# Patient Record
Sex: FEMALE | Race: WHITE | NOT HISPANIC OR LATINO | Employment: FULL TIME | URBAN - METROPOLITAN AREA
[De-identification: names, ages, dates, MRNs, and addresses within clinical notes are randomized per-mention and may not be internally consistent; named-entity substitution may affect disease eponyms.]

---

## 2017-01-31 ENCOUNTER — OFFICE VISIT (OUTPATIENT)
Dept: FAMILY MEDICINE | Facility: CLINIC | Age: 27
End: 2017-01-31
Payer: COMMERCIAL

## 2017-01-31 VITALS
SYSTOLIC BLOOD PRESSURE: 104 MMHG | BODY MASS INDEX: 20.98 KG/M2 | WEIGHT: 138.4 LBS | HEIGHT: 68 IN | OXYGEN SATURATION: 100 % | HEART RATE: 81 BPM | TEMPERATURE: 97.7 F | DIASTOLIC BLOOD PRESSURE: 72 MMHG

## 2017-01-31 DIAGNOSIS — R21 RASH: ICD-10-CM

## 2017-01-31 DIAGNOSIS — Z80.8 FAMILY HISTORY OF MELANOMA: ICD-10-CM

## 2017-01-31 DIAGNOSIS — B35.4 TINEA CORPORIS: Primary | ICD-10-CM

## 2017-01-31 LAB
KOH PREP SPEC: ABNORMAL
MICRO REPORT STATUS: ABNORMAL
SPECIMEN SOURCE: ABNORMAL

## 2017-01-31 PROCEDURE — 87220 TISSUE EXAM FOR FUNGI: CPT | Performed by: FAMILY MEDICINE

## 2017-01-31 PROCEDURE — 99214 OFFICE O/P EST MOD 30 MIN: CPT | Performed by: FAMILY MEDICINE

## 2017-01-31 RX ORDER — CICLOPIROX OLAMINE 7.7 MG/G
CREAM TOPICAL 2 TIMES DAILY
Qty: 30 G | Refills: 1 | Status: SHIPPED | OUTPATIENT
Start: 2017-01-31 | End: 2017-03-29

## 2017-01-31 NOTE — PROGRESS NOTES
SUBJECTIVE:                                                    Laura Wilkes is a 26 year old female who presents to clinic today for the following health issues:      Rash      Duration: 1 month    Description  Location: R leg  Itching: moderate    Intensity:  moderate    Accompanying signs and symptoms: None    History (similar episodes/previous evaluation): None    Precipitating or alleviating factors:  New exposures:  None  Recent travel: no      Therapies tried and outcome: clotrimazole and Lamisil     Laura has had the red, itchy patches on her right leg for the past month. She has tried both clotrimazole and Lamisil without improvement. Started with clotrimazole which did not improve rash then tried Lamisil. The rashes did not get worse but did not get better with treatment. She was using the creams once daily. 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 Has had no changes in detergent, no allergies, no new lotions, no history of tick bites. No new pets. No one around her has this rash. Has not had pain or swelling with the rash. Has a history of exercise induced asthma, reports she rarely takes albuterol unless she is going for a long ride on her bike. No other rashes on her body, although she does have some dry skin patches on her left leg. Mother was diagnosed at age 52 with melanoma, patient has not seen dermatologist before.       -------------------------------------    Problem list and histories reviewed & adjusted, as indicated.  Additional history: as documented    Patient Active Problem List   Diagnosis     Family history of melanoma     Asthma, exercise induced     Left knee pain     No past surgical history on file.    Social History   Substance Use Topics     Smoking status: Never Smoker      Smokeless tobacco: Never Used     Alcohol Use: 0.0 oz/week     0 Standard drinks or equivalent per week      Comment: 3-4 drinks week     Family History  "  Problem Relation Age of Onset     Melanoma Mother 52         Current Outpatient Prescriptions   Medication Sig Dispense Refill     ciclopirox (LOPROX) 0.77 % cream Apply topically 2 times daily 30 g 1     albuterol (ALBUTEROL) 108 (90 BASE) MCG/ACT inhaler Inhale 1-2 puffs into the lungs every 6 hours as needed for shortness of breath / dyspnea 1 Inhaler 6     No Known Allergies  Labs reviewed in EPIC    ROS:  Constitutional, HEENT, cardiovascular, pulmonary, gi and gu systems are negative, except as otherwise noted.    OBJECTIVE:                                                    /72 mmHg  Pulse 81  Temp(Src) 97.7  F (36.5  C) (Oral)  Ht 5' 7.5\" (1.715 m)  Wt 138 lb 6.4 oz (62.778 kg)  BMI 21.34 kg/m2  SpO2 100%  LMP 01/19/2017  Body mass index is 21.34 kg/(m^2).  GENERAL: healthy, alert and no distress  MS: no gross musculoskeletal defects noted, no edema  SKIN:  right lower extremity: four,1cm x 1cm, erythematous circular plaques on shin and medial calf. Borders of circular patch are darker red than center. Plaques have some scaling and are rough to touch. Left lower extremity has two small, non erythematous, rough patches of skin near knee and near ankle on anterior shin.   PSYCH: mentation appears normal, affect normal/bright    Diagnostic Test Results:  Results for orders placed or performed in visit on 01/31/17 (from the past 24 hour(s))   KOH prep (skin, hair or nails only)   Result Value Ref Range    Specimen Description Leg     JAYSON Skin Hair Nails Test Fungal elements seen (A)     Micro Report Status FINAL 01/31/2017         ASSESSMENT/PLAN:                                                        ICD-10-CM    1. Tinea corporis B35.4 ciclopirox (LOPROX) 0.77 % cream   2. Rash R21 KOH prep (skin, hair or nails only)   3. Family history of melanoma Z80.8 DERMATOLOGY REFERRAL       Rash: most likely tinea corporis of right lower extremity given the following characteristics: erythematous, " circular plaques on the anterior shin with darker red borders compared to center of rash.  KOH positive today. Although patient has tried two different antifungal topical agents, she's used each once daily for about a week- likely not enough. Less likely eczema as patient has no history of eczema, although it was red and itching with dry rough skin. Characteristic of the rash less likely eczema. Less likely psoriasis without patient history of psoriasis and the rash lacked silvery scales.     - ciclopirox 0.77% apply topically 2 times daily.    - apply a few days after rash has cleared     Derm referral for family history of melanoma (pt mother diagnosed at age 52).      This note is scribed by Radha Boucher MS3 on behalf of Dr. Broussard.     The medical student has acted as my scribe.  I have completed all components of the history, physical exam and assessment and plan and agree with the note as documented.  Dea Broussard MD  [facility]

## 2017-01-31 NOTE — NURSING NOTE
"Chief Complaint   Patient presents with     Derm Problem     rash on R leg, x 1 month, has tried OTC creams/lotions with no improvement     /72 mmHg  Pulse 81  Temp(Src) 97.7  F (36.5  C) (Oral)  Ht 5' 7.5\" (1.715 m)  Wt 138 lb 6.4 oz (62.778 kg)  BMI 21.34 kg/m2  SpO2 100%  LMP 01/19/2017 Estimated body mass index is 21.34 kg/(m^2) as calculated from the following:    Height as of this encounter: 5' 7.5\" (1.715 m).    Weight as of this encounter: 138 lb 6.4 oz (62.778 kg).  BP completed using cuff size: regular       Health Maintenance due pending provider review:  ACT    PHQ/ACT/FATOUMATA--Gave pt questionnare    Prerna Salazar CMA    "

## 2017-02-01 ASSESSMENT — ASTHMA QUESTIONNAIRES: ACT_TOTALSCORE: 25

## 2017-03-03 ENCOUNTER — OFFICE VISIT (OUTPATIENT)
Dept: FAMILY MEDICINE | Facility: CLINIC | Age: 27
End: 2017-03-03
Payer: COMMERCIAL

## 2017-03-03 DIAGNOSIS — B35.4 TINEA CORPORIS: Primary | ICD-10-CM

## 2017-03-03 PROCEDURE — 99213 OFFICE O/P EST LOW 20 MIN: CPT | Performed by: FAMILY MEDICINE

## 2017-03-03 RX ORDER — ECONAZOLE NITRATE 10 MG/G
CREAM TOPICAL
Qty: 30 G | Refills: 1 | Status: SHIPPED | OUTPATIENT
Start: 2017-03-03 | End: 2017-06-01

## 2017-03-03 NOTE — MR AVS SNAPSHOT
After Visit Summary   3/3/2017    Laura Wilkes    MRN: [id number]           Patient Information     Date Of Birth          1990        Visit Information        Provider Department      3/3/2017 7:20 AM Cindi Weldon MD [facility] Primary Care Skin        Today's Diagnoses     Tinea corporis    -  1      Care Instructions    FUTURE APPOINTMENTS  Follow up with Dr. Weldon as needed if symptoms not resolving.  You should have annual full-body skin cancer screenings due to your family history of melanoma.    TOPICAL ANTIFUNGAL  Apply econzaole 1% cream generously (half an inch beyond the borders) one time(s) a day for 3 weeks to the affected areas.    Discontinue use of ciclopirox 0.77% cream.    Purchase and/or clean new blades and razors. Do not shave legs until symptoms are fully resolved.  Continue applying moisturizer regularly after application of antifungal cream.        Follow-ups after your visit        Follow-up notes from your care team     Return if symptoms worsen or fail to improve.      Who to contact     If you have questions or need follow up information about today's clinic visit or your schedule please contact [facility] - PRIMARY CARE SKIN directly at [phone].  Normal or non-critical lab and imaging results will be communicated to you by MyChart, letter or phone within 4 business days after the clinic has received the results. If you do not hear from us within 7 days, please contact the clinic through G[employer]ness[employer] or phone. If you have a critical or abnormal lab result, we will notify you by phone as soon as possible.  Submit refill requests through Clipyoo or call your pharmacy and they will forward the refill request to us. Please allow 3 business days for your refill to be completed.          Additional Information About Your Visit        MyChart Information     Clipyoo lets you send messages to your doctor, view your test results, renew your  "prescriptions, schedule appointments and more. To sign up, go to [email or web address] . Click on \"Log in\" on the left side of the screen, which will take you to the Welcome page. Then click on \"Sign up Now\" on the right side of the page.     You will be asked to enter the access code listed below, as well as some personal information. Please follow the directions to create your username and password.     Your access code is: [email or web address]  Expires: 2017  9:57 AM     Your access code will  in 90 days. If you need help or a new code, please call your [place] clinic or [phone].        Care EveryWhere ID     This is your Care EveryWhere ID. This could be used by other organizations to access your [place] medical records  [id number]         Blood Pressure from Last 3 Encounters:   17 104/72   16 98/66   16 96/58    Weight from Last 3 Encounters:   17 138 lb 6.4 oz (62.8 kg)   16 133 lb 8 oz (60.6 kg)   16 134 lb (60.8 kg)              Today, you had the following     No orders found for display         Today's Medication Changes          These changes are accurate as of: 3/3/17  7:37 AM.  If you have any questions, ask your nurse or doctor.               Start taking these medicines.        Dose/Directions    econazole nitrate 1 % cream   Used for:  Tinea corporis   Started by:  Cindi Weldon MD        Apply once per day for 3 weeks   Quantity:  30 g   Refills:  1            Where to get your medicines      These medications were sent to [facility] [id number] [place] [address] [address] [facility]  [address] [place] [address]     Phone:  [phone]     econazole nitrate 1 % cream                Primary Care Provider Office Phone # Fax #    Bailey SIGRID Garza [phone]       [facility] [address]  [facility] [address]        Thank you!     Thank you for choosing " [facility] - PRIMARY CARE SKIN  for your care. Our goal is always to provide you with excellent care. Hearing back from our patients is one way we can continue to improve our services. Please take a few minutes to complete the written survey that you may receive in the mail after your visit with us. Thank you!             Your Updated Medication List - Protect others around you: Learn how to safely use, store and throw away your medicines at www.disposemymeds.org.          This list is accurate as of: 3/3/17  7:37 AM.  Always use your most recent med list.                   Brand Name Dispense Instructions for use    albuterol 108 (90 BASE) MCG/ACT Inhaler    albuterol    1 Inhaler    Inhale 1-2 puffs into the lungs every 6 hours as needed for shortness of breath / dyspnea       ciclopirox 0.77 % cream    LOPROX    30 g    Apply topically 2 times daily       econazole nitrate 1 % cream     30 g    Apply once per day for 3 weeks

## 2017-03-03 NOTE — PATIENT INSTRUCTIONS
FUTURE APPOINTMENTS  Follow up with Dr. Weldon as needed if symptoms not resolving.  You should have annual full-body skin cancer screenings due to your family history of melanoma.    TOPICAL ANTIFUNGAL  Apply econzaole 1% cream generously (half an inch beyond the borders) one time(s) a day for 3 weeks to the affected areas.    Discontinue use of ciclopirox 0.77% cream.    Purchase and/or clean new blades and razors. Do not shave legs until symptoms are fully resolved.  Continue applying moisturizer regularly after application of antifungal cream.

## 2017-03-03 NOTE — PROGRESS NOTES
[facility] - PRIMARY CARE SKIN    CC : Rash   SUBJECTIVE:                                                    Laura Wilkes is a 26 year old female who presents to clinic today because of a(n) rash beginning 6 weeks ago on the legs. Itchiness has flared in the last 1 week, and it is exacerbated by shaving.    Pruritic : extremely itchy.  Symptoms appear to be : worsening.  Aggravating factors : shaving.  Relieving factors : none identified.    Previous history of a similar rash : YES - recent 6 week history.  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    Therapies tried for rash : Rx ciclopirox 0.77% cream applied BID begun 1/31/17. OTC Lotrimin or Lamisil applied before primary care physician office visit.  Side effects noted : Left finger irritation after application of cream.    Lab results from 1/31/2017  Office Visit on 01/31/2017   Component Date Value Ref Range Status     Specimen Description 01/31/2017 Leg   Final     KOH Skin Hair Nails Test 01/31/2017 Fungal elements seen*  Final     Micro Report Status 01/31/2017 FINAL 01/31/2017   Final     Personal Medical History  Skin Cancer : NO  Eczema Psoriasis Rosacea Autoimmune Other   NO NO NO NO NO     Family Medical History  Skin Cancer : YES - melanoma in mother  Eczema Psoriasis Rosacea Autoimmune Other   NO NO NO NO NO     Occupation : teacher (indoor).    Patient Active Problem List   Diagnosis     Family history of melanoma     Asthma, exercise induced     Left knee pain       Past Medical History   Diagnosis Date     Asthma exacerbation, mild 1995     Family history of melanoma     History reviewed. No pertinent past surgical history.   Social History   Substance Use Topics     Smoking status: Never Smoker     Smokeless tobacco: Never Used     Alcohol use 0.0 oz/week     0 Standard drinks or equivalent per week      Comment: 3-4 drinks week    Family History     Problem (# of  Occurrences) Relation (Name,Age of Onset)    Melanoma (1) Mother (52)           Prescription Medications as of 3/3/2017             econazole nitrate 1 % cream Apply once per day for 3 weeks    ciclopirox (LOPROX) 0.77 % cream Apply topically 2 times daily    albuterol (ALBUTEROL) 108 (90 BASE) MCG/ACT inhaler Inhale 1-2 puffs into the lungs every 6 hours as needed for shortness of breath / dyspnea          No Known Allergies     INTEGUMENTARY/SKIN: POSITIVE for pruritis and rash  ROS : 14 point review of systems was negative except the symptoms listed above in the HPI.    This document serves as a record of the services and decisions personally performed and made by Nakita Weldon MD. It was created on her behalf by Lcio Delatorre, a trained medical scribe.  The creation of this document is based on the scribe's personal observations and the provider's statements to the medical scribe.  Lico Delatorre, March 3, 2017 7:25 AM      OBJECTIVE:                                                    GENERAL: healthy, alert and no distress  SKIN: Wiley Skin Type - II.  Legs were examined. The dermatoscope was used to help evaluate pigmented lesions.  Skin Pertinent Findings:  Right leg : Circular, salmon-colored patch with slight central clearing. Linear, salmon-colored patch with central clearing on shin.    Left leg : Scattered 5 mm in size patches of erythema with raised ring appearance.    Diagnostic Test Results:  none     MDM : change to econazole cream if not clearing with that , then consider oral terbinafine       ASSESSMENT:                                                      Encounter Diagnosis   Name Primary?     Tinea corporis Yes         PLAN:                                                    Patient Instructions   FUTURE APPOINTMENTS  Follow up with Dr. Weldon as needed if symptoms not resolving.  You should have annual full-body skin cancer screenings due to your family history of melanoma.    TOPICAL  ANTIFUNGAL  Apply econzaole 1% cream generously (half an inch beyond the borders) one time(s) a day for 3 weeks to the affected areas.    Discontinue use of ciclopirox 0.77% cream.    Purchase and/or clean new blades and razors. Do not shave legs until symptoms are fully resolved.  Continue applying moisturizer regularly after application of antifungal cream.        PROCEDURES:                                                    None.    TT : 20 minutes.  CT : 15 minutes.      The information in this document, created by the medical scribe for me, accurately reflects the services I personally performed and the decisions made by me. I have reviewed and approved this document for accuracy prior to leaving the patient care area.  Nakita Weldon MD March 3, 2017 7:25 AM  [facility] - PRIMARY CARE SKIN

## 2017-03-17 ENCOUNTER — TELEPHONE (OUTPATIENT)
Dept: FAMILY MEDICINE | Facility: CLINIC | Age: 27
End: 2017-03-17

## 2017-03-17 DIAGNOSIS — B35.4 TINEA CORPORIS: Primary | ICD-10-CM

## 2017-03-17 RX ORDER — TERBINAFINE HYDROCHLORIDE 250 MG/1
250 TABLET ORAL DAILY
Qty: 14 TABLET | Refills: 0 | Status: SHIPPED | OUTPATIENT
Start: 2017-03-17 | End: 2017-04-04

## 2017-03-17 NOTE — TELEPHONE ENCOUNTER
Left message on home number with below information    Lashawn Davison,RN  [facility]  [phone]

## 2017-03-17 NOTE — TELEPHONE ENCOUNTER
Patient left voice message re: fungal infection, was seen on 3/3/17 Dr. Weldon- prescribed econazole nitrate 1 % cream. Medication is not working, fungal infection is spreading. Would like to try oral medication? Other options? Please advise.  Preferred pharmacy: [facility] [id number] [place] [address] [address]  Ok to leave detailed message: yes  [phone] (home)     Thank you  Adrianna Gallagher

## 2017-03-17 NOTE — TELEPHONE ENCOUNTER
patient calling to notify that the topical hasn't helped any and the fungus has now spread to her arms.   She would like the oral fungal that was discussed prescribed.      Lashawn Davison RN  [facility]  [phone]

## 2017-03-17 NOTE — TELEPHONE ENCOUNTER
Please let her know that I faxed over terbinafine 250 mg one tab per day for 2 weeks, continue with the topical cream.

## 2017-03-27 ENCOUNTER — ANESTHESIA (OUTPATIENT)
Dept: SURGERY | Facility: CLINIC | Age: 27
End: 2017-03-27
Payer: COMMERCIAL

## 2017-03-27 ENCOUNTER — ANESTHESIA EVENT (OUTPATIENT)
Dept: SURGERY | Facility: CLINIC | Age: 27
End: 2017-03-27
Payer: COMMERCIAL

## 2017-03-27 ENCOUNTER — OFFICE VISIT (OUTPATIENT)
Dept: FAMILY MEDICINE | Facility: CLINIC | Age: 27
End: 2017-03-27
Payer: COMMERCIAL

## 2017-03-27 ENCOUNTER — APPOINTMENT (OUTPATIENT)
Dept: CT IMAGING | Facility: CLINIC | Age: 27
End: 2017-03-27
Attending: EMERGENCY MEDICINE
Payer: COMMERCIAL

## 2017-03-27 ENCOUNTER — HOSPITAL ENCOUNTER (OUTPATIENT)
Facility: CLINIC | Age: 27
Setting detail: OBSERVATION
Discharge: HOME | End: 2017-03-28
Attending: EMERGENCY MEDICINE | Admitting: SURGERY
Payer: COMMERCIAL

## 2017-03-27 ENCOUNTER — TELEPHONE (OUTPATIENT)
Dept: FAMILY MEDICINE | Facility: CLINIC | Age: 27
End: 2017-03-27

## 2017-03-27 VITALS
HEART RATE: 75 BPM | SYSTOLIC BLOOD PRESSURE: 106 MMHG | DIASTOLIC BLOOD PRESSURE: 64 MMHG | WEIGHT: 139 LBS | BODY MASS INDEX: 21.45 KG/M2 | RESPIRATION RATE: 12 BRPM | OXYGEN SATURATION: 99 % | TEMPERATURE: 98.9 F

## 2017-03-27 DIAGNOSIS — R10.31 ABDOMINAL PAIN, RIGHT LOWER QUADRANT: Primary | ICD-10-CM

## 2017-03-27 DIAGNOSIS — K35.30 ACUTE APPENDICITIS WITH LOCALIZED PERITONITIS: ICD-10-CM

## 2017-03-27 PROBLEM — K35.80 ACUTE APPENDICITIS: Status: ACTIVE | Noted: 2017-03-27

## 2017-03-27 LAB
ALBUMIN SERPL-MCNC: 3.8 G/DL (ref 3.4–5)
ALBUMIN UR-MCNC: 30 MG/DL
ALP SERPL-CCNC: 49 U/L (ref 40–150)
ALT SERPL W P-5'-P-CCNC: 51 U/L (ref 0–50)
ANION GAP SERPL CALCULATED.3IONS-SCNC: 8 MMOL/L (ref 3–14)
APPEARANCE UR: ABNORMAL
AST SERPL W P-5'-P-CCNC: 37 U/L (ref 0–45)
BACTERIA #/AREA URNS HPF: ABNORMAL /HPF
BASOPHILS # BLD AUTO: 0 10E9/L (ref 0–0.2)
BASOPHILS NFR BLD AUTO: 0.1 %
BILIRUB SERPL-MCNC: 0.7 MG/DL (ref 0.2–1.3)
BILIRUB UR QL STRIP: NEGATIVE
BUN SERPL-MCNC: 7 MG/DL (ref 7–30)
CALCIUM SERPL-MCNC: 8.4 MG/DL (ref 8.5–10.1)
CHLORIDE SERPL-SCNC: 106 MMOL/L (ref 94–109)
CO2 SERPL-SCNC: 26 MMOL/L (ref 20–32)
COLOR UR AUTO: YELLOW
CREAT SERPL-MCNC: 0.85 MG/DL (ref 0.52–1.04)
CRP SERPL-MCNC: 34 MG/L (ref 0–8)
DIFFERENTIAL METHOD BLD: ABNORMAL
EOSINOPHIL # BLD AUTO: 0 10E9/L (ref 0–0.7)
EOSINOPHIL NFR BLD AUTO: 0 %
ERYTHROCYTE [DISTWIDTH] IN BLOOD BY AUTOMATED COUNT: 13.1 % (ref 10–15)
ERYTHROCYTE [SEDIMENTATION RATE] IN BLOOD BY WESTERGREN METHOD: 9 MM/H (ref 0–20)
GFR SERPL CREATININE-BSD FRML MDRD: 79 ML/MIN/1.7M2
GLUCOSE SERPL-MCNC: 103 MG/DL (ref 70–99)
GLUCOSE UR STRIP-MCNC: NEGATIVE MG/DL
HCG SERPL QL: NEGATIVE
HCT VFR BLD AUTO: 40.4 % (ref 35–47)
HGB BLD-MCNC: 13.4 G/DL (ref 11.7–15.7)
HGB UR QL STRIP: NEGATIVE
KETONES UR STRIP-MCNC: 10 MG/DL
LEUKOCYTE ESTERASE UR QL STRIP: ABNORMAL
LYMPHOCYTES # BLD AUTO: 1.6 10E9/L (ref 0.8–5.3)
LYMPHOCYTES NFR BLD AUTO: 9.2 %
MCH RBC QN AUTO: 31.5 PG (ref 26.5–33)
MCHC RBC AUTO-ENTMCNC: 33.2 G/DL (ref 31.5–36.5)
MCV RBC AUTO: 95 FL (ref 78–100)
MICRO REPORT STATUS: NORMAL
MONOCYTES # BLD AUTO: 1.3 10E9/L (ref 0–1.3)
MONOCYTES NFR BLD AUTO: 7.5 %
MUCOUS THREADS #/AREA URNS LPF: PRESENT /LPF
NEUTROPHILS # BLD AUTO: 14.7 10E9/L (ref 1.6–8.3)
NEUTROPHILS NFR BLD AUTO: 83.2 %
NITRATE UR QL: NEGATIVE
PH UR STRIP: 6 PH (ref 5–7)
PLATELET # BLD AUTO: 287 10E9/L (ref 150–450)
POTASSIUM SERPL-SCNC: 3.7 MMOL/L (ref 3.4–5.3)
PROT SERPL-MCNC: 7.1 G/DL (ref 6.8–8.8)
RADIOLOGIST FLAGS: ABNORMAL
RBC # BLD AUTO: 4.26 10E12/L (ref 3.8–5.2)
RBC #/AREA URNS AUTO: 5 /HPF (ref 0–2)
SODIUM SERPL-SCNC: 140 MMOL/L (ref 133–144)
SP GR UR STRIP: 1.02 (ref 1–1.03)
SPECIMEN SOURCE: NORMAL
SQUAMOUS #/AREA URNS AUTO: 15 /HPF (ref 0–1)
URN SPEC COLLECT METH UR: ABNORMAL
UROBILINOGEN UR STRIP-MCNC: NORMAL MG/DL (ref 0–2)
WBC # BLD AUTO: 17.6 10E9/L (ref 4–11)
WBC #/AREA URNS AUTO: 18 /HPF (ref 0–2)
WET PREP SPEC: NORMAL

## 2017-03-27 PROCEDURE — 96376 TX/PRO/DX INJ SAME DRUG ADON: CPT | Mod: 59 | Performed by: EMERGENCY MEDICINE

## 2017-03-27 PROCEDURE — 99214 OFFICE O/P EST MOD 30 MIN: CPT | Performed by: NURSE PRACTITIONER

## 2017-03-27 PROCEDURE — 36000059 ZZH SURGERY LEVEL 3 EA 15 ADDTL MIN UMMC: Performed by: SURGERY

## 2017-03-27 PROCEDURE — 36415 COLL VENOUS BLD VENIPUNCTURE: CPT | Performed by: NURSE PRACTITIONER

## 2017-03-27 PROCEDURE — 86140 C-REACTIVE PROTEIN: CPT | Performed by: NURSE PRACTITIONER

## 2017-03-27 PROCEDURE — 96374 THER/PROPH/DIAG INJ IV PUSH: CPT | Mod: 59 | Performed by: EMERGENCY MEDICINE

## 2017-03-27 PROCEDURE — 25800025 ZZH RX 258: Performed by: STUDENT IN AN ORGANIZED HEALTH CARE EDUCATION/TRAINING PROGRAM

## 2017-03-27 PROCEDURE — 96361 HYDRATE IV INFUSION ADD-ON: CPT | Mod: 59 | Performed by: EMERGENCY MEDICINE

## 2017-03-27 PROCEDURE — 87210 SMEAR WET MOUNT SALINE/INK: CPT | Performed by: EMERGENCY MEDICINE

## 2017-03-27 PROCEDURE — 36000057 ZZH SURGERY LEVEL 3 1ST 30 MIN - UMMC: Performed by: SURGERY

## 2017-03-27 PROCEDURE — C9399 UNCLASSIFIED DRUGS OR BIOLOG: HCPCS | Performed by: STUDENT IN AN ORGANIZED HEALTH CARE EDUCATION/TRAINING PROGRAM

## 2017-03-27 PROCEDURE — 74177 CT ABD & PELVIS W/CONTRAST: CPT

## 2017-03-27 PROCEDURE — 84703 CHORIONIC GONADOTROPIN ASSAY: CPT | Performed by: EMERGENCY MEDICINE

## 2017-03-27 PROCEDURE — 25000125 ZZHC RX 250: Performed by: STUDENT IN AN ORGANIZED HEALTH CARE EDUCATION/TRAINING PROGRAM

## 2017-03-27 PROCEDURE — 99285 EMERGENCY DEPT VISIT HI MDM: CPT | Mod: Z6 | Performed by: EMERGENCY MEDICINE

## 2017-03-27 PROCEDURE — 80053 COMPREHEN METABOLIC PANEL: CPT | Performed by: NURSE PRACTITIONER

## 2017-03-27 PROCEDURE — 88304 TISSUE EXAM BY PATHOLOGIST: CPT | Performed by: SURGERY

## 2017-03-27 PROCEDURE — 37000008 ZZH ANESTHESIA TECHNICAL FEE, 1ST 30 MIN: Performed by: SURGERY

## 2017-03-27 PROCEDURE — 25000565 ZZH ISOFLURANE, EA 15 MIN: Performed by: SURGERY

## 2017-03-27 PROCEDURE — 25000128 H RX IP 250 OP 636: Performed by: EMERGENCY MEDICINE

## 2017-03-27 PROCEDURE — 85025 COMPLETE CBC W/AUTO DIFF WBC: CPT | Performed by: NURSE PRACTITIONER

## 2017-03-27 PROCEDURE — 37000009 ZZH ANESTHESIA TECHNICAL FEE, EACH ADDTL 15 MIN: Performed by: SURGERY

## 2017-03-27 PROCEDURE — 71000012 ZZH RECOVERY PHASE 1 LEVEL 1 FIRST HR: Performed by: SURGERY

## 2017-03-27 PROCEDURE — 87591 N.GONORRHOEAE DNA AMP PROB: CPT | Performed by: EMERGENCY MEDICINE

## 2017-03-27 PROCEDURE — 87491 CHLMYD TRACH DNA AMP PROBE: CPT | Performed by: EMERGENCY MEDICINE

## 2017-03-27 PROCEDURE — 80053 COMPREHEN METABOLIC PANEL: CPT | Performed by: EMERGENCY MEDICINE

## 2017-03-27 PROCEDURE — 81001 URINALYSIS AUTO W/SCOPE: CPT | Performed by: EMERGENCY MEDICINE

## 2017-03-27 PROCEDURE — 87086 URINE CULTURE/COLONY COUNT: CPT | Performed by: EMERGENCY MEDICINE

## 2017-03-27 PROCEDURE — 71000013 ZZH RECOVERY PHASE 1 LEVEL 1 EA ADDTL HR: Performed by: SURGERY

## 2017-03-27 PROCEDURE — 25500064 ZZH RX 255 OP 636: Performed by: STUDENT IN AN ORGANIZED HEALTH CARE EDUCATION/TRAINING PROGRAM

## 2017-03-27 PROCEDURE — 27210794 ZZH OR GENERAL SUPPLY STERILE: Performed by: SURGERY

## 2017-03-27 PROCEDURE — S0020 INJECTION, BUPIVICAINE HYDRO: HCPCS | Performed by: SURGERY

## 2017-03-27 PROCEDURE — 25000125 ZZHC RX 250: Performed by: SURGERY

## 2017-03-27 PROCEDURE — 40000170 ZZH STATISTIC PRE-PROCEDURE ASSESSMENT II: Performed by: SURGERY

## 2017-03-27 PROCEDURE — 96375 TX/PRO/DX INJ NEW DRUG ADDON: CPT | Mod: 59 | Performed by: EMERGENCY MEDICINE

## 2017-03-27 PROCEDURE — 99285 EMERGENCY DEPT VISIT HI MDM: CPT | Mod: 25 | Performed by: EMERGENCY MEDICINE

## 2017-03-27 PROCEDURE — 85652 RBC SED RATE AUTOMATED: CPT | Performed by: NURSE PRACTITIONER

## 2017-03-27 PROCEDURE — 25000128 H RX IP 250 OP 636: Performed by: SURGERY

## 2017-03-27 PROCEDURE — 25000128 H RX IP 250 OP 636: Performed by: STUDENT IN AN ORGANIZED HEALTH CARE EDUCATION/TRAINING PROGRAM

## 2017-03-27 RX ORDER — LABETALOL HYDROCHLORIDE 5 MG/ML
10 INJECTION, SOLUTION INTRAVENOUS
Status: DISCONTINUED | OUTPATIENT
Start: 2017-03-27 | End: 2017-03-28 | Stop reason: HOSPADM

## 2017-03-27 RX ORDER — IOPAMIDOL 755 MG/ML
85 INJECTION, SOLUTION INTRAVASCULAR ONCE
Status: COMPLETED | OUTPATIENT
Start: 2017-03-27 | End: 2017-03-27

## 2017-03-27 RX ORDER — SODIUM CHLORIDE, SODIUM LACTATE, POTASSIUM CHLORIDE, CALCIUM CHLORIDE 600; 310; 30; 20 MG/100ML; MG/100ML; MG/100ML; MG/100ML
INJECTION, SOLUTION INTRAVENOUS CONTINUOUS
Status: DISCONTINUED | OUTPATIENT
Start: 2017-03-28 | End: 2017-03-28 | Stop reason: HOSPADM

## 2017-03-27 RX ORDER — SODIUM CHLORIDE, SODIUM LACTATE, POTASSIUM CHLORIDE, CALCIUM CHLORIDE 600; 310; 30; 20 MG/100ML; MG/100ML; MG/100ML; MG/100ML
INJECTION, SOLUTION INTRAVENOUS CONTINUOUS PRN
Status: DISCONTINUED | OUTPATIENT
Start: 2017-03-27 | End: 2017-03-27

## 2017-03-27 RX ORDER — ONDANSETRON 2 MG/ML
4 INJECTION INTRAMUSCULAR; INTRAVENOUS EVERY 30 MIN PRN
Status: DISCONTINUED | OUTPATIENT
Start: 2017-03-27 | End: 2017-03-28 | Stop reason: HOSPADM

## 2017-03-27 RX ORDER — ERTAPENEM 1 G/1
1 INJECTION, POWDER, LYOPHILIZED, FOR SOLUTION INTRAMUSCULAR; INTRAVENOUS ONCE
Status: COMPLETED | OUTPATIENT
Start: 2017-03-27 | End: 2017-03-27

## 2017-03-27 RX ORDER — LIDOCAINE HYDROCHLORIDE 10 MG/ML
INJECTION, SOLUTION EPIDURAL; INFILTRATION; INTRACAUDAL; PERINEURAL PRN
Status: DISCONTINUED | OUTPATIENT
Start: 2017-03-27 | End: 2017-03-28 | Stop reason: HOSPADM

## 2017-03-27 RX ORDER — KETOROLAC TROMETHAMINE 30 MG/ML
30 INJECTION, SOLUTION INTRAMUSCULAR; INTRAVENOUS ONCE
Status: COMPLETED | OUTPATIENT
Start: 2017-03-27 | End: 2017-03-27

## 2017-03-27 RX ORDER — MORPHINE SULFATE 4 MG/ML
4 INJECTION, SOLUTION INTRAMUSCULAR; INTRAVENOUS ONCE
Status: COMPLETED | OUTPATIENT
Start: 2017-03-27 | End: 2017-03-27

## 2017-03-27 RX ORDER — ONDANSETRON 2 MG/ML
INJECTION INTRAMUSCULAR; INTRAVENOUS PRN
Status: DISCONTINUED | OUTPATIENT
Start: 2017-03-27 | End: 2017-03-27

## 2017-03-27 RX ORDER — ONDANSETRON 4 MG/1
4 TABLET, ORALLY DISINTEGRATING ORAL EVERY 30 MIN PRN
Status: DISCONTINUED | OUTPATIENT
Start: 2017-03-27 | End: 2017-03-28 | Stop reason: HOSPADM

## 2017-03-27 RX ORDER — DEXAMETHASONE SODIUM PHOSPHATE 4 MG/ML
INJECTION, SOLUTION INTRA-ARTICULAR; INTRALESIONAL; INTRAMUSCULAR; INTRAVENOUS; SOFT TISSUE PRN
Status: DISCONTINUED | OUTPATIENT
Start: 2017-03-27 | End: 2017-03-27

## 2017-03-27 RX ORDER — SODIUM CHLORIDE 9 MG/ML
INJECTION, SOLUTION INTRAVENOUS CONTINUOUS
Status: DISCONTINUED | OUTPATIENT
Start: 2017-03-27 | End: 2017-03-27 | Stop reason: HOSPADM

## 2017-03-27 RX ORDER — ONDANSETRON 2 MG/ML
4 INJECTION INTRAMUSCULAR; INTRAVENOUS ONCE
Status: COMPLETED | OUTPATIENT
Start: 2017-03-27 | End: 2017-03-27

## 2017-03-27 RX ORDER — FENTANYL CITRATE 50 UG/ML
25-50 INJECTION, SOLUTION INTRAMUSCULAR; INTRAVENOUS
Status: DISCONTINUED | OUTPATIENT
Start: 2017-03-27 | End: 2017-03-28 | Stop reason: HOSPADM

## 2017-03-27 RX ORDER — PROPOFOL 10 MG/ML
INJECTION, EMULSION INTRAVENOUS PRN
Status: DISCONTINUED | OUTPATIENT
Start: 2017-03-27 | End: 2017-03-27

## 2017-03-27 RX ORDER — SODIUM CHLORIDE, SODIUM LACTATE, POTASSIUM CHLORIDE, CALCIUM CHLORIDE 600; 310; 30; 20 MG/100ML; MG/100ML; MG/100ML; MG/100ML
INJECTION, SOLUTION INTRAVENOUS CONTINUOUS
Status: DISCONTINUED | OUTPATIENT
Start: 2017-03-27 | End: 2017-03-28 | Stop reason: HOSPADM

## 2017-03-27 RX ORDER — BUPIVACAINE HYDROCHLORIDE 5 MG/ML
INJECTION, SOLUTION EPIDURAL; INTRACAUDAL PRN
Status: DISCONTINUED | OUTPATIENT
Start: 2017-03-27 | End: 2017-03-28 | Stop reason: HOSPADM

## 2017-03-27 RX ORDER — HYDROMORPHONE HYDROCHLORIDE 1 MG/ML
.3-.5 INJECTION, SOLUTION INTRAMUSCULAR; INTRAVENOUS; SUBCUTANEOUS EVERY 5 MIN PRN
Status: DISCONTINUED | OUTPATIENT
Start: 2017-03-27 | End: 2017-03-28 | Stop reason: HOSPADM

## 2017-03-27 RX ORDER — LIDOCAINE HYDROCHLORIDE 20 MG/ML
INJECTION, SOLUTION INFILTRATION; PERINEURAL PRN
Status: DISCONTINUED | OUTPATIENT
Start: 2017-03-27 | End: 2017-03-27

## 2017-03-27 RX ORDER — FENTANYL CITRATE 50 UG/ML
INJECTION, SOLUTION INTRAMUSCULAR; INTRAVENOUS PRN
Status: DISCONTINUED | OUTPATIENT
Start: 2017-03-27 | End: 2017-03-27

## 2017-03-27 RX ADMIN — PROPOFOL 30 MG: 10 INJECTION, EMULSION INTRAVENOUS at 23:02

## 2017-03-27 RX ADMIN — SODIUM CHLORIDE 1000 ML: 9 INJECTION, SOLUTION INTRAVENOUS at 12:22

## 2017-03-27 RX ADMIN — FENTANYL CITRATE 150 MCG: 50 INJECTION, SOLUTION INTRAMUSCULAR; INTRAVENOUS at 21:55

## 2017-03-27 RX ADMIN — LIDOCAINE HYDROCHLORIDE 100 MG: 20 INJECTION, SOLUTION INFILTRATION; PERINEURAL at 21:55

## 2017-03-27 RX ADMIN — SODIUM CHLORIDE, PRESERVATIVE FREE 71 ML: 5 INJECTION INTRAVENOUS at 14:49

## 2017-03-27 RX ADMIN — PROPOFOL 20 MG: 10 INJECTION, EMULSION INTRAVENOUS at 23:10

## 2017-03-27 RX ADMIN — SODIUM CHLORIDE: 9 INJECTION, SOLUTION INTRAVENOUS at 21:49

## 2017-03-27 RX ADMIN — PROPOFOL 120 MG: 10 INJECTION, EMULSION INTRAVENOUS at 21:55

## 2017-03-27 RX ADMIN — ROCURONIUM BROMIDE 50 MG: 10 INJECTION INTRAVENOUS at 21:56

## 2017-03-27 RX ADMIN — ROCURONIUM BROMIDE 10 MG: 10 INJECTION INTRAVENOUS at 22:29

## 2017-03-27 RX ADMIN — FENTANYL CITRATE 25 MCG: 50 INJECTION, SOLUTION INTRAMUSCULAR; INTRAVENOUS at 23:53

## 2017-03-27 RX ADMIN — PHENYLEPHRINE HYDROCHLORIDE 100 MCG: 10 INJECTION, SOLUTION INTRAMUSCULAR; INTRAVENOUS; SUBCUTANEOUS at 22:22

## 2017-03-27 RX ADMIN — ONDANSETRON 4 MG: 2 INJECTION INTRAMUSCULAR; INTRAVENOUS at 12:21

## 2017-03-27 RX ADMIN — SODIUM CHLORIDE 1000 ML: 900 INJECTION, SOLUTION INTRAVENOUS at 16:30

## 2017-03-27 RX ADMIN — ERTAPENEM SODIUM 1 G: 1 INJECTION, POWDER, LYOPHILIZED, FOR SOLUTION INTRAMUSCULAR; INTRAVENOUS at 15:26

## 2017-03-27 RX ADMIN — PROPOFOL 50 MG: 10 INJECTION, EMULSION INTRAVENOUS at 22:28

## 2017-03-27 RX ADMIN — IOPAMIDOL 85 ML: 755 INJECTION, SOLUTION INTRAVENOUS at 14:49

## 2017-03-27 RX ADMIN — ONDANSETRON 4 MG: 2 INJECTION INTRAMUSCULAR; INTRAVENOUS at 23:04

## 2017-03-27 RX ADMIN — SODIUM CHLORIDE, POTASSIUM CHLORIDE, SODIUM LACTATE AND CALCIUM CHLORIDE: 600; 310; 30; 20 INJECTION, SOLUTION INTRAVENOUS at 22:38

## 2017-03-27 RX ADMIN — KETOROLAC TROMETHAMINE 30 MG: 30 INJECTION, SOLUTION INTRAMUSCULAR at 12:19

## 2017-03-27 RX ADMIN — MORPHINE SULFATE 4 MG: 4 INJECTION, SOLUTION INTRAMUSCULAR; INTRAVENOUS at 15:34

## 2017-03-27 RX ADMIN — DEXAMETHASONE SODIUM PHOSPHATE 4 MG: 4 INJECTION, SOLUTION INTRAMUSCULAR; INTRAVENOUS at 22:06

## 2017-03-27 RX ADMIN — SUGAMMADEX 120 MG: 100 INJECTION, SOLUTION INTRAVENOUS at 23:19

## 2017-03-27 RX ADMIN — MORPHINE SULFATE 4 MG: 4 INJECTION, SOLUTION INTRAMUSCULAR; INTRAVENOUS at 12:18

## 2017-03-27 RX ADMIN — SODIUM CHLORIDE 1000 ML: 900 INJECTION, SOLUTION INTRAVENOUS at 14:12

## 2017-03-27 ASSESSMENT — ENCOUNTER SYMPTOMS
ARTHRALGIAS: 0
SHORTNESS OF BREATH: 0
CONSTIPATION: 0
HEADACHES: 0
NAUSEA: 1
DIARRHEA: 0
FEVER: 0
COLOR CHANGE: 0
NECK STIFFNESS: 0
DIFFICULTY URINATING: 0
ABDOMINAL PAIN: 1
EYE REDNESS: 0
CONFUSION: 0

## 2017-03-27 NOTE — IP AVS SNAPSHOT
[address] [facility] [address] [address]    Phone:  [phone]    Fax:  [phone]                                       After Visit Summary   3/27/2017    Laura Wilkes    MRN: [id number]           After Visit Summary Signature Page     I have received my discharge instructions, and my questions have been answered. I have discussed any challenges I see with this plan with the nurse or doctor.    ..........................................................................................................................................  Patient/Patient Representative Signature      ..........................................................................................................................................  Patient Representative Print Name and Relationship to Patient    ..................................................               ................................................  Date                                            Time    ..........................................................................................................................................  Reviewed by Signature/Title    ...................................................              ..............................................  Date                                                            Time

## 2017-03-27 NOTE — ED PROVIDER NOTES
History     Chief Complaint   Patient presents with     Abdominal Pain     HPI  Laura Wilkes is a 26 year old female who presents to the Emergency Department with abdominal pain. She states that yesterday she developed right lower quadrant abdominal pain. The pain radiates outwards and is worse with movement. She states that she has had self-induced vomiting which provided some relief of her symptoms. The patient denies any fevers but states that she has had hot and cold flashes. She reports that her skins feel sensitive. Patient states that her bowel movements have been normal. She reports that she uses condoms during sexual intercourse and her LMP is 03/17/2017. Patient had labs drawn in clinic today and her white count is elevated to 17.6.     I have reviewed the Medications, Allergies, Past Medical and Surgical History, and Social History in the [employer] system.    PAST MEDICAL HISTORY  Past Medical History:   Diagnosis Date     Asthma exacerbation, mild 1995     Family history of melanoma      PAST SURGICAL HISTORY  No past surgical history on file.  FAMILY HISTORY  Family History   Problem Relation Age of Onset     Melanoma Mother 52     SOCIAL HISTORY  Social History   Substance Use Topics     Smoking status: Never Smoker     Smokeless tobacco: Never Used     Alcohol use No      Comment: 3-4 drinks week     MEDICATIONS  No current facility-administered medications for this encounter.      Current Outpatient Prescriptions   Medication     terbinafine (LAMISIL) 250 MG tablet     econazole nitrate 1 % cream     ciclopirox (LOPROX) 0.77 % cream     albuterol (ALBUTEROL) 108 (90 BASE) MCG/ACT inhaler     ALLERGIES  No Known Allergies     Review of Systems   Constitutional: Negative for fever.   HENT: Negative for congestion.    Eyes: Negative for redness.   Respiratory: Negative for shortness of breath.    Cardiovascular: Negative for chest pain.   Gastrointestinal: Positive for abdominal pain and nausea.  Negative for constipation and diarrhea.   Genitourinary: Negative for difficulty urinating.   Musculoskeletal: Negative for arthralgias and neck stiffness.   Skin: Negative for color change.   Neurological: Negative for headaches.   Psychiatric/Behavioral: Negative for confusion.   All other systems reviewed and are negative.      Physical Exam   BP: 133/87  Pulse: 65  Heart Rate: 65  Temp: 98.6  F (37  C)  SpO2: 94 %  Physical Exam   Constitutional: She appears well-developed and well-nourished. No distress.   HENT:   Head: Normocephalic and atraumatic.   Eyes: Pupils are equal, round, and reactive to light. No scleral icterus.   Neck: Normal range of motion.   Cardiovascular: Normal rate, regular rhythm, normal heart sounds and intact distal pulses.    Pulmonary/Chest: Breath sounds normal. No respiratory distress.   Abdominal: Soft. Bowel sounds are normal. There is tenderness in the right lower quadrant. There is guarding.   Genitourinary: Uterus normal. Cervix exhibits no motion tenderness. Right adnexum displays no mass and no tenderness. Left adnexum displays no mass and no tenderness.   Musculoskeletal: Normal range of motion. She exhibits no edema or tenderness.   Neurological: She is alert. She has normal strength. Coordination normal.   Skin: Skin is warm. No rash noted. She is not diaphoretic.   Nursing note and vitals reviewed.      ED Course     ED Course     Procedures            Critical Care time:     Results for orders placed or performed during the hospital encounter of 03/27/17   CT Abdomen Pelvis w Contrast   Result Value Ref Range    Radiologist flags Appendicitis (Urgent)     Narrative    EXAMINATION: CT ABDOMEN PELVIS W CONTRAST, 3/27/2017 3:00 PM    TECHNIQUE:  Helical CT images from the lung bases through the  symphysis pubis were obtained with IV contrast. Contrast dose:  iopamidol (ISOVUE-370) solution 85 mL    COMPARISON: None    HISTORY: RLQ pain- eval for appy    FINDINGS:    Abdomen  and pelvis: The appendix is abnormally dilated and  stool-filled measuring up to 1.6-1.7 cm on series 2 image 58 and  coronal image 27. The appendix is best visualized on coronal images  19-39. There are mild periappendiceal inflammatory changes best  visualized on coronal image 23. No definite perforation although  isodense right ovary makes pelvic evaluation difficult.    Mild periportal edema. Cholelithiasis. The spleen has a few tiny  calcified foci compatible with prior granulomatous disease. The  pancreas, adrenals, and kidneys are unremarkable. No abnormally  dilated loops of large or small bowel. Trace amount of free fluid in  the pelvis, likely physiologic. Normal abdominal aorta. No abnormal  retroperitoneal or pelvic lymphadenopathy.    Lung bases: Clear.    Bones and soft tissues: Benign-appearing limits chronic foci in the  left ilium on coronal image 45.      Impression    IMPRESSION:   1. Appendicitis.  2. Cholelithiasis.     [Urgent Result: Appendicitis]    Finding was identified on 3/27/2017 3:10 PM.     Dr. Warner was contacted by Dr. Brush at 3/27/2017 3:14 PM and  verbalized understanding of the urgent finding.     I have personally reviewed the examination and initial interpretation  and I agree with the findings.    BELLA CHUNG MD   Comprehensive metabolic panel   Result Value Ref Range    Sodium 140 133 - 144 mmol/L    Potassium 3.7 3.4 - 5.3 mmol/L    Chloride 106 94 - 109 mmol/L    Carbon Dioxide 26 20 - 32 mmol/L    Anion Gap 8 3 - 14 mmol/L    Glucose 103 (H) 70 - 99 mg/dL    Urea Nitrogen 7 7 - 30 mg/dL    Creatinine 0.85 0.52 - 1.04 mg/dL    GFR Estimate 79 >60 mL/min/1.7m2    GFR Estimate If Black >90   GFR Calc   >60 mL/min/1.7m2    Calcium 8.4 (L) 8.5 - 10.1 mg/dL    Bilirubin Total 0.7 0.2 - 1.3 mg/dL    Albumin 3.8 3.4 - 5.0 g/dL    Protein Total 7.1 6.8 - 8.8 g/dL    Alkaline Phosphatase 49 40 - 150 U/L    ALT 51 (H) 0 - 50 U/L    AST 37 0 - 45 U/L   UA with  Microscopic reflex to Culture   Result Value Ref Range    Color Urine Yellow     Appearance Urine Slightly Cloudy     Glucose Urine Negative NEG mg/dL    Bilirubin Urine Negative NEG    Ketones Urine 10 (A) NEG mg/dL    Specific Gravity Urine 1.025 1.003 - 1.035    Blood Urine Negative NEG    pH Urine 6.0 5.0 - 7.0 pH    Protein Albumin Urine 30 (A) NEG mg/dL    Urobilinogen mg/dL Normal 0.0 - 2.0 mg/dL    Nitrite Urine Negative NEG    Leukocyte Esterase Urine Large (A) NEG    Source Midstream Urine     WBC Urine 18 (H) 0 - 2 /HPF    RBC Urine 5 (H) 0 - 2 /HPF    Bacteria Urine Few (A) NEG /HPF    Squamous Epithelial /HPF Urine 15 (H) 0 - 1 /HPF    Mucous Urine Present (A) NEG /LPF   HCG qualitative   Result Value Ref Range    HCG Qualitative Serum Negative NEG   Wet prep   Result Value Ref Range    Specimen Description Cervix     Wet Prep       No Trichomonas seen  No yeast seen  PMNs seen Few  No clue cells seen      Micro Report Status FINAL 03/27/2017    Urine Culture Aerobic Bacterial   Result Value Ref Range    Specimen Description Midstream Urine     Special Requests Specimen received in preservative     Culture Micro Pending     Micro Report Status Pending       Medications   0.9% sodium chloride BOLUS (0 mLs Intravenous Stopped 3/27/17 1412)   ondansetron (ZOFRAN) injection 4 mg (4 mg Intravenous Given 3/27/17 1221)   ketorolac (TORADOL) injection 30 mg (30 mg Intravenous Given 3/27/17 1219)   morphine (PF) injection 4 mg (4 mg Intravenous Given 3/27/17 1218)   0.9% sodium chloride BOLUS (1,000 mLs Intravenous New Bag 3/27/17 1412)   iopamidol (ISOVUE-370) solution 85 mL (85 mLs Intravenous Given 3/27/17 1449)   sodium chloride 0.9 % for CT scan flush dose 71 mL (71 mLs Intravenous Given 3/27/17 1449)   ertapenem (INVanz) 1 g vial to attach to  mL bag (1 g Intravenous New Bag 3/27/17 1526)   morphine (PF) injection 4 mg (4 mg Intravenous Given 3/27/17 1534)      Seen by General Surgery.      Assessments & Plan (with Medical Decision Making)   26 year old female to the ED for right lower quadrant abdominal pain.  She has a leukocytosis of 17.6.  Her CT reveals acute appendicitis.  Her urinalysis has white blood cells and few red blood cells but suspect that is reactive from her acute appendicitis.  Patient given ertapenem here in the emergency department.  She will be taken to the OR by general surgery for appendectomy.    I have reviewed the nursing notes.    I have reviewed the findings, diagnosis, plan and need for follow up with the patient.    New Prescriptions    No medications on file       Final diagnoses:   Acute appendicitis with localized peritonitis     IPavel, am serving as a trained medical scribe to document services personally performed by Finesse Warner MD, based on the provider's statements to me.      IFinesse MD, was physically present and have reviewed and verified the accuracy of this note documented by Pavel Ruiz.    3/27/2017   [facility], EMERGENCY DEPARTMENT     Finesse Warner MD  03/27/17 6945

## 2017-03-27 NOTE — PROGRESS NOTES
SUBJECTIVE:                                                    Laura Wilkes is a 26 year old female who presents to clinic today for the following health issues:    Abdominal Pain      Duration: yesterday AM    Description (location/character/radiation): right sided abdominal pain at naval level       Associated flank pain: None    Intensity:  4/10 constant, up to 10/10    Accompanying signs and symptoms:        Fever/Chills: YES, chills and sweats       Gas/Bloating: no        Nausea/vomitting: YES- both       Diarrhea: no        Dysuria or Hematuria: no        Myalgias: YES    History (previous similar pain/trauma/previous testing): none    Precipitating or alleviating factors:       Pain worse with eating/BM/urination: NA, hasn't eaten       Pain relieved by BM: no     Therapies tried and outcome: Pepto bismol- not helpful    LMP:  3/17/17       Abd pain started yesterday morning and worsened throughout the day, couldnt sleep last night.  Started as generalized abdominal discomfort, has localized more to RLQ and radiated to RUQ.    Has had chills and sweats, feels achy, not checking temp.  Nausea, threw up yesterday.  Last BM yesterday afternoon, normal consistency.  No blood in stools or dark tarry stools.  She is able to drink, last ate late yesterday morning.    No dysuria.  Uses condoms for protection, they are consistent with them.  No frequent alcohol or NSAID use.  Works at school.  No history of abd surgery.      Patient Active Problem List   Diagnosis     Family history of melanoma     Asthma, exercise induced     Left knee pain     History reviewed. No pertinent surgical history.    Social History   Substance Use Topics     Smoking status: Never Smoker     Smokeless tobacco: Never Used     Alcohol use 0.0 oz/week     0 Standard drinks or equivalent per week      Comment: 3-4 drinks week     Family History   Problem Relation Age of Onset     Melanoma Mother 52         Current Outpatient  Prescriptions   Medication Sig Dispense Refill     terbinafine (LAMISIL) 250 MG tablet Take 1 tablet (250 mg) by mouth daily 14 tablet 0     econazole nitrate 1 % cream Apply once per day for 3 weeks 30 g 1     albuterol (ALBUTEROL) 108 (90 BASE) MCG/ACT inhaler Inhale 1-2 puffs into the lungs every 6 hours as needed for shortness of breath / dyspnea 1 Inhaler 6     ciclopirox (LOPROX) 0.77 % cream Apply topically 2 times daily (Patient not taking: Reported on 3/27/2017) 30 g 1       ROS:  Const, , GI as above, otherwise negative       OBJECTIVE:                                                    /64 (BP Location: Right arm, Patient Position: Chair, Cuff Size: Adult Regular)  Pulse 75  Temp 98.9  F (37.2  C) (Tympanic)  Resp 12  Wt 139 lb (63 kg)  LMP 03/17/2017 (Exact Date)  SpO2 99%  Breastfeeding? No  BMI 21.45 kg/m2   GENERAL APPEARANCE: healthy, alert and no distress  EYES: Eyes grossly normal to inspection and conjunctivae and sclerae normal  RESP: respirations nonlabored   ABDOMEN: bowel sounds normal.  Tenderness with palpation of bilateral lower quadrants with mild guarding.  No rebound tenderness.  + obturator sign.  No palpable mass.   PSYCH: mentation appears normal and affect normal/bright     Results for orders placed or performed in visit on 03/27/17   ESR: Erythrocyte sedimentation rate   Result Value Ref Range    Sed Rate 9 0 - 20 mm/h   CBC with platelets differential   Result Value Ref Range    WBC 17.6 (H) 4.0 - 11.0 10e9/L    RBC Count 4.26 3.8 - 5.2 10e12/L    Hemoglobin 13.4 11.7 - 15.7 g/dL    Hematocrit 40.4 35.0 - 47.0 %    MCV 95 78 - 100 fl    MCH 31.5 26.5 - 33.0 pg    MCHC 33.2 31.5 - 36.5 g/dL    RDW 13.1 10.0 - 15.0 %    Platelet Count 287 150 - 450 10e9/L    Diff Method Automated Method     % Neutrophils 83.2 %    % Lymphocytes 9.2 %    % Monocytes 7.5 %    % Eosinophils 0.0 %    % Basophils 0.1 %    Absolute Neutrophil 14.7 (H) 1.6 - 8.3 10e9/L    Absolute  Lymphocytes 1.6 0.8 - 5.3 10e9/L    Absolute Monocytes 1.3 0.0 - 1.3 10e9/L    Absolute Eosinophils 0.0 0.0 - 0.7 10e9/L    Absolute Basophils 0.0 0.0 - 0.2 10e9/L          ASSESSMENT/PLAN:                                                    (R10.31) Abdominal pain, right lower quadrant  (primary encounter diagnosis)  Comment: elevated white count, abd exam concerning for acute appendicitis   Plan: ESR: Erythrocyte sedimentation rate, CRP,         inflammation, Comprehensive metabolic panel,         CBC with platelets differential        Advised pt proceed to [place] ER for imaging and possible surgery, she is in agreement and her partner will be driving her.  Report called to ER provider.         See Patient Instructions    Bailey Jasmine CNP  [facility]    There are no Patient Instructions on file for this visit.

## 2017-03-27 NOTE — NURSING NOTE
"Chief Complaint   Patient presents with     Abdominal Pain       Initial /64 (BP Location: Right arm, Patient Position: Chair, Cuff Size: Adult Regular)  Pulse 75  Temp 98.9  F (37.2  C) (Tympanic)  Resp 12  Wt 139 lb (63 kg)  LMP 03/17/2017 (Exact Date)  SpO2 99%  Breastfeeding? No  BMI 21.45 kg/m2 Estimated body mass index is 21.45 kg/(m^2) as calculated from the following:    Height as of 1/31/17: 5' 7.5\" (1.715 m).    Weight as of this encounter: 139 lb (63 kg).  Medication Reconciliation: complete     Nohelia Young, ROGELIO    "

## 2017-03-27 NOTE — TELEPHONE ENCOUNTER
Patient called and spoke with writer.    Per patient:  1. Yesterday began having abdominal pain   A. Location: mostly right quadrant but radiates to entire abdominal cavity   B. Constant pain with flares of intensity   C. Nausea present   D. Decreased appetite   E. Chills, but has not taken temperature   G. One emesis yesterday around 1600  2. Able to keep water down  3. Last bowel movement was yesterday and it was normal  4. Denied dizziness, weakness, diarrhea, constipation  5. Started Terbinafine on 3/17/17      Writer recommended:  1. Office visit today.  Appt scheduled this morning with KHLOE Jasmine.  Appt date, time and location confirmed with patient  2. Call back with any new, changing or worsening symptoms    Patient verbalized understanding and in agreement with plan.    ELIUD Alfred, EDWARDN, RN

## 2017-03-27 NOTE — MR AVS SNAPSHOT
"              After Visit Summary   3/27/2017    Laura Wilkes    MRN: [id number]           Patient Information     Date Of Birth          1990        Visit Information        Provider Department      3/27/2017 10:00 AM Bailey Jasmine APRN CNP [facility]        Today's Diagnoses     Abdominal pain, right lower quadrant    -  1       Follow-ups after your visit        Who to contact     If you have questions or need follow up information about today's clinic visit or your schedule please contact [facility] directly at [phone].  Normal or non-critical lab and imaging results will be communicated to you by [employer], letter or phone within 4 business days after the clinic has received the results. If you do not hear from us within 7 days, please contact the clinic through [employer] or phone. If you have a critical or abnormal lab result, we will notify you by phone as soon as possible.  Submit refill requests through [employer] or call your pharmacy and they will forward the refill request to us. Please allow 3 business days for your refill to be completed.          Additional Information About Your Visit        MyChart Information     [employer] lets you send messages to your doctor, view your test results, renew your prescriptions, schedule appointments and more. To sign up, go to [email or web address] . Click on \"Log in\" on the left side of the screen, which will take you to the Welcome page. Then click on \"Sign up Now\" on the right side of the page.     You will be asked to enter the access code listed below, as well as some personal information. Please follow the directions to create your username and password.     Your access code is: [email or web address]  Expires: 2017 10:57 AM     Your access code will  in 90 days. If you need help or a new code, please call your [facility] or [phone].        Care EveryWhere ID     This is your Care EveryWhere ID. This " could be used by other organizations to access your [facility] medical records  [id number]        Your Vitals Were     Pulse Temperature Respirations Last Period Pulse Oximetry Breastfeeding?    75 98.9  F (37.2  C) (Tympanic) 12 03/17/2017 (Exact Date) 99% No    BMI (Body Mass Index)                   21.45 kg/m2            Blood Pressure from Last 3 Encounters:   03/27/17 133/87   03/27/17 106/64   01/31/17 104/72    Weight from Last 3 Encounters:   03/27/17 139 lb (63 kg)   01/31/17 138 lb 6.4 oz (62.8 kg)   06/08/16 133 lb 8 oz (60.6 kg)              We Performed the Following     CBC with platelets differential     Comprehensive metabolic panel     CRP, inflammation     ESR: Erythrocyte sedimentation rate        Primary Care Provider Office Phone # Fax #    SIGRID Carbajal [facility] [phone]       [facility] [address]  [facility] [address]        Thank you!     Thank you for choosing [facility]  for your care. Our goal is always to provide you with excellent care. Hearing back from our patients is one way we can continue to improve our services. Please take a few minutes to complete the written survey that you may receive in the mail after your visit with us. Thank you!             Your Updated Medication List - Protect others around you: Learn how to safely use, store and throw away your medicines at www.disposemymeds.org.          This list is accurate as of: 3/27/17 10:59 AM.  Always use your most recent med list.                   Brand Name Dispense Instructions for use    albuterol 108 (90 BASE) MCG/ACT Inhaler    albuterol    1 Inhaler    Inhale 1-2 puffs into the lungs every 6 hours as needed for shortness of breath / dyspnea       ciclopirox 0.77 % cream    LOPROX    30 g    Apply topically 2 times daily       econazole nitrate 1 % cream     30 g    Apply once per day for 3 weeks       terbinafine 250 MG tablet    lamISIL    14 tablet    Take 1  tablet (250 mg) by mouth daily

## 2017-03-27 NOTE — ANESTHESIA PREPROCEDURE EVALUATION
Anesthesia Evaluation     . Pt has not had prior anesthetic            ROS/MED HX    ENT/Pulmonary:     (+)Mild Persistent asthma , . .    Neurologic:  - neg neurologic ROS     Cardiovascular:  - neg cardiovascular ROS       METS/Exercise Tolerance:     Hematologic:  - neg hematologic  ROS       Musculoskeletal:  - neg musculoskeletal ROS       GI/Hepatic:     (+) Other GI/Hepatic appendicitis      Renal/Genitourinary:  - ROS Renal section negative       Endo:  - neg endo ROS       Psychiatric:  - neg psychiatric ROS       Infectious Disease:   (+) Other Infectious Disease recent ringworm      Malignancy:      - no malignancy   Other:    - neg other ROS                 Physical Exam  Normal systems: cardiovascular, pulmonary and dental    Airway   Mallampati: I  TM distance: >3 FB  Neck ROM: full    Dental     Cardiovascular   Rhythm and rate: regular and normal      Pulmonary    breath sounds clear to auscultation                    ANESTHESIA PREOP EVALUATION    Procedure: Procedure(s):  LAPAROTOMY EXPLORATORY  - Wound Class: III-Contaminated    HPI: Laura Wilkes is a 26 year old female who is presenting for above stated procedure.    PMHx/PSHx/ROS:  Past Medical History:   Diagnosis Date     Asthma exacerbation, mild 1995     Family history of melanoma        No past surgical history on file.    ROS as stated above    Soc Hx:   Social History   Substance Use Topics     Smoking status: Never Smoker     Smokeless tobacco: Never Used     Alcohol use No      Comment: 3-4 drinks week       Allergies: No Known Allergies    Meds:     (Not in a hospital admission)    Current Outpatient Prescriptions   Medication Sig Dispense Refill     terbinafine (LAMISIL) 250 MG tablet Take 1 tablet (250 mg) by mouth daily 14 tablet 0     econazole nitrate 1 % cream Apply once per day for 3 weeks 30 g 1     ciclopirox (LOPROX) 0.77 % cream Apply topically 2 times daily (Patient not taking: Reported on 3/27/2017) 30 g 1      albuterol (ALBUTEROL) 108 (90 BASE) MCG/ACT inhaler Inhale 1-2 puffs into the lungs every 6 hours as needed for shortness of breath / dyspnea 1 Inhaler 6       Physical Exam:  VS: Temp:  [37  C (98.6  F)-37.2  C (98.9  F)] 37  C (98.6  F)  Pulse:  [65-75] 65  Heart Rate:  [65] 65  Resp:  [12] 12  BP: (106-133)/(64-87) 133/87  SpO2:  [94 %-100 %] 100 %   100%, Weight   Wt Readings from Last 2 Encounters:   03/27/17 63 kg (139 lb)   01/31/17 62.8 kg (138 lb 6.4 oz)       Labs:    BMP:  Recent Labs   Lab Test  03/27/17   1154   NA  140   POTASSIUM  3.7   CHLORIDE  106   CO2  26   BUN  7   CR  0.85   GLC  103*   DAMIAN  8.4*     LFTs:   Recent Labs   Lab Test  03/27/17   1154   PROTTOTAL  7.1   ALBUMIN  3.8   BILITOTAL  0.7   ALKPHOS  49   AST  37   ALT  51*     CBC:   Recent Labs   Lab Test  03/27/17   1026   WBC  17.6*   RBC  4.26   HGB  13.4   HCT  40.4   MCV  95   MCH  31.5   MCHC  33.2   RDW  13.1   PLT  287     Coags:  No results for input(s): INR, PTT, FIBR in the last [id number] hours.        Patient discussed with Staff Anesthesiologist.    Naga Pearson  Anesthesia Resident CA-1  Pager [phone]  March 27, 2017, 5:32 PM    Anesthesia Plan      History & Physical Review  History and physical reviewed and following examination; no interval change.    ASA Status:  2 emergent.        Plan for General, RSI and ETT with Intravenous induction. Maintenance will be Balanced.    PONV prophylaxis:  Ondansetron (or other 5HT-3) and Dexamethasone or Solumedrol  Laura Wilkes is a 26 year old female with appendicitis who requires a laparoscopic appendectomy with Dr. Nunez. PMH significant for mild persistent asthma and recent ringworm infection.       Postoperative Care  Postoperative pain management:  IV analgesics.      Consents  Anesthetic plan, risks, benefits and alternatives discussed with:  Patient..

## 2017-03-27 NOTE — CONSULTS
GENERAL SURGERY HISTORY AND PHYSICAL    ASSESSMENT/PLAN: Laura Wilkes is a 26-year-old female with acute non-perforated appendicitis.       -- Discussed risks and benefits of laparoscopic appendectomy. Patient elects to proceed with surgical intervention.  -- Laparoscopic appendectomy this evening. Consent obtained, pre-op orders placed, discussed with OR.  -- 1 g ertapenem given in ED  -- NPO, IVF  -- May discharge from PACU if meeting criteria    Discussed with Dr. Nunez.    Haresh Tejeda MD  PGY-2, General Surgery  Pager: [phone]    - - - - - - - - - -    CHIEF COMPLAINT: abdominal pain    HISTORY OF PRESENT ILLNESS: Laura Wilkes is a 26-year-old female with abdominal pain. The pain began yesterday morning. It is an 8/10 at its worst. It is worst with movement. It does not radiate. She tried taking Pepto-Bismol for it which did not help. She reports associated nausea and an episode of vomiting without hematemesis. She denies diarrhea/constipation. She has had a fever/chills since yesterday afternoon.     REVIEW OF SYSTEMS: A full 10 point review of systems was asked and negative except as above.     PAST MEDICAL HISTORY:   Past Medical History:   Diagnosis Date     Asthma exacerbation, mild 1995     Family history of melanoma    Recent ringworm infection    SURGICAL HISTORY:   No surgery in the past    SOCIAL HISTORY: Tob - none. Etoh - occasionally. No illicit drug use.     FAMILY HISTORY: No bleeding/clotting disorders nor problems with anesthesia.     ALLERGIES:    No Known Allergies    MEDICATIONS:    No current facility-administered medications on file prior to encounter.   Current Outpatient Prescriptions on File Prior to Encounter:  terbinafine (LAMISIL) 250 MG tablet Take 1 tablet (250 mg) by mouth daily   econazole nitrate 1 % cream Apply once per day for 3 weeks   ciclopirox (LOPROX) 0.77 % cream Apply topically 2 times daily (Patient not taking: Reported on 3/27/2017)   albuterol  (ALBUTEROL) 108 (90 BASE) MCG/ACT inhaler Inhale 1-2 puffs into the lungs every 6 hours as needed for shortness of breath / dyspnea       PHYSICAL EXAM:   /87  Pulse 65  Temp 98.6  F (37  C)  LMP 03/17/2017 (Exact Date)  SpO2 98%  General: Alert, well-appearing female in no acute distress.  HEENT: Normocephalic, atraumatic. Patent nares. EOMI. PERRLA. Sclerae anicteric.  Chest wall: Symmetric thorax.   Respiratory: Non-labored breathing on room air.  Cardiovascular: Regular rate and rhythm.   Gastrointestinal: Abdomen soft, non-distended, tender to palpation at McBurney's point. Positive Rosvig's sign and obturator sign. Negative psoas sign.   Extremities: Moving all four extremities. No limb deformities. No pedal edema. Peripheral pulses palpable in all distal extremities.   Skin: Resolving fungal rash on RLE.     LAB DATA: Reviewed.   Na 140  K 3.7  Cl 106  CO2 26  BUN 7  Cr 0.85  Ca 8.4  Albumin 3.8  T bili 0.7  Alk phos 49  ALT 51  AST 37  CRP 34.0  bHCG negative  Glucose 103  WBC 17.6  Hgb 13.4  Plt 287    IMAGING:   CT abdomen/pelvis 3/27/2017:  FINDINGS:  Abdomen and pelvis: The appendix is abnormally dilated and  stool-filled measuring up to 1.6-1.7 cm on series 2 image 58 and  coronal image 27. The appendix is best visualized on coronal images  19-39. There are mild periappendiceal inflammatory changes best  visualized on coronal image 23. No definite perforation although  isodense right ovary makes pelvic evaluation difficult.     Mild periportal edema. Cholelithiasis. The spleen has a few tiny  calcified foci compatible with prior granulomatous disease. The  pancreas, adrenals, and kidneys are unremarkable. No abnormally  dilated loops of large or small bowel. Trace amount of free fluid in  the pelvis, likely physiologic. Normal abdominal aorta. No abnormal  retroperitoneal or pelvic lymphadenopathy.     Lung bases: Clear.     Bones and soft tissues: Benign-appearing limits chronic foci in  the  left ilium on coronal image 45.      IMPRESSION:   1. Appendicitis.  2. Cholelithiasis.

## 2017-03-27 NOTE — IP AVS SNAPSHOT
MRN:[id number]                      After Visit Summary   3/27/2017    Laura Wilkes    MRN: [id number]           Thank you!     Thank you for choosing [facility] for your care. Our goal is always to provide you with excellent care. Hearing back from our patients is one way we can continue to improve our services. Please take a few minutes to complete the written survey that you may receive in the mail after you visit with us. Thank you!        Patient Information     Date Of Birth          1990        About your hospital stay     You were admitted on:  March 27, 2017 You last received care in the:  Unit 6D Observation [facility]    You were discharged on:  March 28, 2017        Reason for your hospital stay       Appendicitis                  Who to Call     For medical emergencies, please call 911.  For non-urgent questions about your medical care, please call your primary care provider or clinic, [phone]  For questions related to your surgery, please call your surgery clinic        Attending Provider     Provider Specialty    Finesse Warner MD Emergency Medicine    Celestino, Antione Mcwilliams MD Emergency Medicine    Enrique, Cathi Calles MD General Surgery       Primary Care Provider Office Phone # Fax #    Bailey Vega SIGRID Jasmine [facility] [phone]       [place] [id number] [facility] [address]        After Care Instructions     Activity       Your activity upon discharge: activity as tolerated, no heavy lifting >15 lbs for next 6 weeks            Diet       Follow this diet upon discharge: Orders Placed This Encounter      Regular Diet Adult            Discharge Instructions       1. No driving while using narcotics for pain control  2. Narcotic pain medications may cause constipation. Use over the counter fiber supplements and miralax if constipated  3. Use tylenol and ibuprofen to control pain as well  4. No heavy lifting for next 4-6  "weeks  5. You can shower but avoid soaking in bath tub until incisions completely healed                  Follow-up Appointments     Adult [facility] Follow-up and recommended labs and tests       Phone call followup with patient and if patient wants to can follow up with Dr. Nunez    Appointments on [place] and/or [facility] (with [facility] or [facility] provider or service). Call [phone] if you haven't heard regarding these appointments within 7 days of discharge.                  Additional Information     If you use hormonal birth control (such as the pill, patch, ring or implants): You'll need a second form of birth control for 7 days (condoms, a diaphragm or contraceptive foam). While in the hospital, you received a medicine called Bridion. Your normal birth control will not work as well for a week after taking this medicine.          Pending Results     Date and Time Order Name Status Description    3/27/2017 2302 Surgical pathology exam In process     3/27/2017 1223 Urine Culture Aerobic Bacterial Preliminary     3/27/2017 1129 Neisseria gonorrhoea PCR In process     3/27/2017 1129 Chlamydia trachomatis PCR In process     3/27/2017 1023 COMPREHENSIVE METABOLIC PANEL In process             Statement of Approval     Ordered          03/28/17 1133  I have reviewed and agree with all the recommendations and orders detailed in this document.  EFFECTIVE NOW     Approved and electronically signed by:  Bruno Gonzalez MD             Admission Information     Date & Time Provider Department Dept. Phone    3/27/2017 Cathi Nunez MD Unit 6D Observation [facility] [place] [phone]      Your Vitals Were     Blood Pressure Pulse Temperature Respirations Height Weight    99/55 65 98.1  F (36.7  C) (Oral) 14 1.676 m (5' 6\") 62.6 kg (138 lb)    Last Period Pulse Oximetry BMI (Body Mass Index)             03/17/2017 (Exact Date) 99% 22.27 kg/m2         MyChart Information     [employer] lets you send " "messages to your doctor, view your test results, renew your prescriptions, schedule appointments and more. To sign up, go to [email or web address] . Click on \"Log in\" on the left side of the screen, which will take you to the Welcome page. Then click on \"Sign up Now\" on the right side of the page.     You will be asked to enter the access code listed below, as well as some personal information. Please follow the directions to create your username and password.     Your access code is: [email or web address]  Expires: 2017 10:57 AM     Your access code will  in 90 days. If you need help or a new code, please call your [facility] clinic or [phone].        Care EveryWhere ID     This is your Care EveryWhere ID. This could be used by other organizations to access your [facility] medical records  [id number]           Review of your medicines      START taking        Dose / Directions    oxyCODONE 5 MG IR tablet   Commonly known as:  ROXICODONE   Used for:  Acute appendicitis with localized peritonitis        Dose:  5-10 mg   Take 1-2 tablets (5-10 mg) by mouth every 6 hours as needed for moderate to severe pain   Quantity:  20 tablet   Refills:  0         CONTINUE these medicines which have NOT CHANGED        Dose / Directions    albuterol 108 (90 BASE) MCG/ACT Inhaler   Commonly known as:  albuterol   Used for:  Intermittent asthma        Dose:  1-2 puff   Inhale 1-2 puffs into the lungs every 6 hours as needed for shortness of breath / dyspnea   Quantity:  1 Inhaler   Refills:  6       ciclopirox 0.77 % cream   Commonly known as:  LOPROX   Used for:  Tinea corporis        Apply topically 2 times daily   Quantity:  30 g   Refills:  1       econazole nitrate 1 % cream   Used for:  Tinea corporis        Apply once per day for 3 weeks   Quantity:  30 g   Refills:  1       terbinafine 250 MG tablet   Commonly known as:  lamISIL   Used for:  Tinea corporis        Dose:  250 mg   Take 1 tablet (250 mg) by mouth daily "   Quantity:  14 tablet   Refills:  0            Where to get your medicines      Some of these will need a paper prescription and others can be bought over the counter. Ask your nurse if you have questions.     Bring a paper prescription for each of these medications     oxyCODONE 5 MG IR tablet                Protect others around you: Learn how to safely use, store and throw away your medicines at www.disposemymeds.org.             Medication List: This is a list of all your medications and when to take them. Check marks below indicate your daily home schedule. Keep this list as a reference.      Medications           Morning Afternoon Evening Bedtime As Needed    albuterol 108 (90 BASE) MCG/ACT Inhaler   Commonly known as:  albuterol   Inhale 1-2 puffs into the lungs every 6 hours as needed for shortness of breath / dyspnea                                ciclopirox 0.77 % cream   Commonly known as:  LOPROX   Apply topically 2 times daily                                econazole nitrate 1 % cream   Apply once per day for 3 weeks                                oxyCODONE 5 MG IR tablet   Commonly known as:  ROXICODONE   Take 1-2 tablets (5-10 mg) by mouth every 6 hours as needed for moderate to severe pain   Last time this was given:  5 mg on 3/28/2017  7:55 AM                                terbinafine 250 MG tablet   Commonly known as:  lamISIL   Take 1 tablet (250 mg) by mouth daily

## 2017-03-27 NOTE — ED NOTES
27 yo female sent in from clinic due to abd pain starting yesterday in [facility].  Has elevated WBC.  currently being treated for ringworm

## 2017-03-28 VITALS
BODY MASS INDEX: 22.18 KG/M2 | HEART RATE: 65 BPM | RESPIRATION RATE: 14 BRPM | WEIGHT: 138 LBS | TEMPERATURE: 98.1 F | SYSTOLIC BLOOD PRESSURE: 99 MMHG | OXYGEN SATURATION: 99 % | HEIGHT: 66 IN | DIASTOLIC BLOOD PRESSURE: 55 MMHG

## 2017-03-28 LAB
ALBUMIN SERPL-MCNC: 4.2 G/DL (ref 3.4–5)
ALP SERPL-CCNC: 52 U/L (ref 40–150)
ALT SERPL W P-5'-P-CCNC: 52 U/L (ref 0–50)
ANION GAP SERPL CALCULATED.3IONS-SCNC: 11 MMOL/L (ref 3–14)
AST SERPL W P-5'-P-CCNC: 41 U/L (ref 0–45)
BACTERIA SPEC CULT: NORMAL
BILIRUB SERPL-MCNC: 0.7 MG/DL (ref 0.2–1.3)
BUN SERPL-MCNC: 7 MG/DL (ref 7–30)
C TRACH DNA SPEC QL NAA+PROBE: NORMAL
CALCIUM SERPL-MCNC: 9.2 MG/DL (ref 8.5–10.1)
CHLORIDE SERPL-SCNC: 106 MMOL/L (ref 94–109)
CO2 SERPL-SCNC: 23 MMOL/L (ref 20–32)
CREAT SERPL-MCNC: 0.82 MG/DL (ref 0.52–1.04)
GFR SERPL CREATININE-BSD FRML MDRD: 82 ML/MIN/1.7M2
GLUCOSE SERPL-MCNC: 102 MG/DL (ref 70–99)
Lab: NORMAL
MICRO REPORT STATUS: NORMAL
N GONORRHOEA DNA SPEC QL NAA+PROBE: NORMAL
POTASSIUM SERPL-SCNC: 4.1 MMOL/L (ref 3.4–5.3)
PROT SERPL-MCNC: 7.4 G/DL (ref 6.8–8.8)
SODIUM SERPL-SCNC: 140 MMOL/L (ref 133–144)
SPECIMEN SOURCE: NORMAL

## 2017-03-28 PROCEDURE — G0378 HOSPITAL OBSERVATION PER HR: HCPCS

## 2017-03-28 PROCEDURE — 25000132 ZZH RX MED GY IP 250 OP 250 PS 637: Performed by: SURGERY

## 2017-03-28 PROCEDURE — 25000128 H RX IP 250 OP 636: Performed by: SURGERY

## 2017-03-28 PROCEDURE — 25000128 H RX IP 250 OP 636: Performed by: ANESTHESIOLOGY

## 2017-03-28 PROCEDURE — 25000125 ZZHC RX 250: Performed by: STUDENT IN AN ORGANIZED HEALTH CARE EDUCATION/TRAINING PROGRAM

## 2017-03-28 RX ORDER — HYDROMORPHONE HCL/0.9% NACL/PF 0.2MG/0.2
0.2 SYRINGE (ML) INTRAVENOUS
Status: DISCONTINUED | OUTPATIENT
Start: 2017-03-28 | End: 2017-03-28 | Stop reason: HOSPADM

## 2017-03-28 RX ORDER — ONDANSETRON 4 MG/1
4 TABLET, ORALLY DISINTEGRATING ORAL EVERY 6 HOURS PRN
Status: DISCONTINUED | OUTPATIENT
Start: 2017-03-28 | End: 2017-03-28 | Stop reason: HOSPADM

## 2017-03-28 RX ORDER — NALOXONE HYDROCHLORIDE 0.4 MG/ML
.1-.4 INJECTION, SOLUTION INTRAMUSCULAR; INTRAVENOUS; SUBCUTANEOUS
Status: DISCONTINUED | OUTPATIENT
Start: 2017-03-28 | End: 2017-03-28 | Stop reason: HOSPADM

## 2017-03-28 RX ORDER — OXYCODONE HYDROCHLORIDE 5 MG/1
5-10 TABLET ORAL EVERY 6 HOURS PRN
Qty: 20 TABLET | Refills: 0 | Status: SHIPPED | OUTPATIENT
Start: 2017-03-28 | End: 2017-03-28

## 2017-03-28 RX ORDER — ONDANSETRON 2 MG/ML
4 INJECTION INTRAMUSCULAR; INTRAVENOUS EVERY 6 HOURS PRN
Status: DISCONTINUED | OUTPATIENT
Start: 2017-03-28 | End: 2017-03-28 | Stop reason: HOSPADM

## 2017-03-28 RX ORDER — OXYCODONE HYDROCHLORIDE 5 MG/1
5-10 TABLET ORAL EVERY 6 HOURS PRN
Qty: 20 TABLET | Refills: 0 | Status: SHIPPED | OUTPATIENT
Start: 2017-03-28 | End: 2017-06-01

## 2017-03-28 RX ORDER — ACETAMINOPHEN 500 MG
1000 TABLET ORAL EVERY 8 HOURS
Status: DISCONTINUED | OUTPATIENT
Start: 2017-03-28 | End: 2017-03-28 | Stop reason: HOSPADM

## 2017-03-28 RX ORDER — ALBUTEROL SULFATE 90 UG/1
1-2 AEROSOL, METERED RESPIRATORY (INHALATION) EVERY 6 HOURS PRN
Status: DISCONTINUED | OUTPATIENT
Start: 2017-03-28 | End: 2017-03-28 | Stop reason: HOSPADM

## 2017-03-28 RX ORDER — OXYCODONE HYDROCHLORIDE 5 MG/1
5-10 TABLET ORAL EVERY 4 HOURS PRN
Status: DISCONTINUED | OUTPATIENT
Start: 2017-03-28 | End: 2017-03-28 | Stop reason: HOSPADM

## 2017-03-28 RX ADMIN — OXYCODONE HYDROCHLORIDE 5 MG: 5 TABLET ORAL at 07:55

## 2017-03-28 RX ADMIN — FENTANYL CITRATE 25 MCG: 50 INJECTION, SOLUTION INTRAMUSCULAR; INTRAVENOUS at 00:09

## 2017-03-28 RX ADMIN — HYDROMORPHONE HYDROCHLORIDE 0.5 MG: 10 INJECTION, SOLUTION INTRAMUSCULAR; INTRAVENOUS; SUBCUTANEOUS at 00:30

## 2017-03-28 RX ADMIN — FENTANYL CITRATE 25 MCG: 50 INJECTION, SOLUTION INTRAMUSCULAR; INTRAVENOUS at 00:02

## 2017-03-28 RX ADMIN — ACETAMINOPHEN 1000 MG: 500 TABLET, FILM COATED ORAL at 06:14

## 2017-03-28 RX ADMIN — HYDROMORPHONE HYDROCHLORIDE 0.2 MG: 10 INJECTION, SOLUTION INTRAMUSCULAR; INTRAVENOUS; SUBCUTANEOUS at 02:56

## 2017-03-28 RX ADMIN — FENTANYL CITRATE 25 MCG: 50 INJECTION, SOLUTION INTRAMUSCULAR; INTRAVENOUS at 00:20

## 2017-03-28 NOTE — PLAN OF CARE
Problem: Discharge Planning  Goal: Discharge Planning (Adult, OB, Behavioral, Peds)  Outpatient/Observation goals to be met before discharge home:     1) Tolerating regular diet: No, patient refused food and will try in morning   2) Pain controlled on PO pain meds: No, received IV dilaudid   3) Ambulating without difficulty: Yes   4) Urinating spontaneously: Yes

## 2017-03-28 NOTE — ANESTHESIA POSTPROCEDURE EVALUATION
Patient: Laura Wilkes    Procedure(s):  LAPAROTOMY EXPLORATORY  - Wound Class: III-Contaminated    Diagnosis:Apendicitis  Diagnosis Additional Information: No value filed.    Anesthesia Type:  General, RSI, ETT    Note:  Anesthesia Post Evaluation    Patient location during evaluation: PACU  Patient participation: Able to fully participate in evaluation  Level of consciousness: awake  Pain management: satisfactory to patient  Airway patency: patent  Cardiovascular status: acceptable  Respiratory status: acceptable  Hydration status: acceptable  PONV: none     Anesthetic complications: None          Last vitals:  Vitals:    03/27/17 1745 03/27/17 1800 03/27/17 1918   BP: 110/71 114/71 114/71   Pulse:   65   Resp:   18   Temp:   36.9  C (98.4  F)   SpO2:  99% 99%         Electronically Signed By: Pavel Villa MD  March 27, 2017  11:44 PM

## 2017-03-28 NOTE — PROGRESS NOTES
Pain controlled with oral meds.  IV's removed. Discharge instructions given and questions answered.  Pt. Able to verbalize understanding.   is driving pt. Home.  Pt. Has all belongings.

## 2017-03-28 NOTE — PROGRESS NOTES
SURGERY POST-OP CHECK NOTE  3/28/2017 4:10 AM     Patient: Laura Wilkes  MRN: [id number]    Subjective  No acute events postoperatively. Pain well controlled. Denies nausea, vomiting, chest pain, shortness of breath. No other complaints.      Objective  Temp:  [97.8  F (36.6  C)-98.9  F (37.2  C)] 97.8  F (36.6  C)  Pulse:  [65-75] 65  Heart Rate:  [60-81] 67  Resp:  [12-18] 14  BP: ()/(52-87) 97/52  SpO2:  [94 %-100 %] 99 %    General: asleep initially, then AAOx4, NAD, lying in bed, appears comfortable  CV: regular rate, wwp  Pulm:  breathing comfortably on NC  Abd: soft, appropriately tender to palpation, non-distended, incision dressings c/d/i  Extremities: without edema, SCDs in place  Neuro: facial movement grossly symmetric, moving all extremities spontaneously without apparent deficit    UOP postop:  250ml      Assessment/Plan  Laura Wilkes is a 26 year old female POD#0 s/p lap appy, doing well.     Neuro:      - Pain: oxycodone, dilaudid prn, Tylenol sched  CV: HDS, no issues  Pulm: satting on NC, wean O2, encourage IS  FEN/GI:      - IVF: TKO     - Diet: ADAT     - Nausea control: Zofran prn  Renal: UOPA, voiding spontaneously  ID: afebrile, ertapenem preop, no further abx  PPx: OOB, IS, SCDs  Dispo: 6D, likely home later today      - - - - - - - - - - - - - - - - - -  Phuong Sarabia MD  General Surgery PGY-1

## 2017-03-28 NOTE — OP NOTE
DATE OF SURGERY:  03/27/2017.      PREOPERATIVE DIAGNOSIS:  Acute appendicitis.       POSTOPERATIVE DIAGNOSIS:  Gangrenous appendicitis.      PROCEDURE:  Laparoscopic appendectomy.      STAFF SURGEON:  Cathi Nunez MD      RESIDENT SURGEON:  Sergio Aly MD      ANESTHESIA:  General endotracheal.      ESTIMATED BLOOD LOSS: 10 mL.      SPECIMENS:  Appendix.      DESCRIPTION OF PROCEDURE:  After informed consent was obtained, Laura Wilkes was brought to the operating room and placed on the table in supine position.  General endotracheal anesthesia was induced without difficulty.  A Monae catheter was placed preoperatively.  Appropriate perioperative antibiotics were administered.  Bilateral pneumatic compression devices were applied to the patient's lower extremities.  All pressure points were adequately cushioned and the left arm was tucked.  The patient's abdomen was prepped and draped in the usual sterile fashion.  A timeout was performed with all operating personnel who confirmed the appropriate patient, procedure and operative site.      A small semicircular infraumbilical incision was made with a scalpel.  We bluntly dissected down through the soft tissues to identify the fascia.  It was grasped between Kocher clamps and entered sharply under direct vision.  A 12 mm balloon port was then placed into the abdomen.  Pneumoperitoneum was established with carbon dioxide to a maximum pressure of 15 mmHg.  Two additional working ports were placed into the abdomen under direct visualization.  One was in the patient's left lower quadrant and one was in the suprapubic area.      The appendix was identified in the right lower quadrant.  It was gangrenous, but nonperforated.  A Harmonic device was used to take down the mesoappendix along the length of the appendix itself.  Thereafter, an Endo-GIANNA stapler blue load 45 mL were used to transect the appendix at its junction with the cecum.  Hemostasis  was adequate at the staple line and along the mesoappendix.  The appendix was placed into an Endocatch bag and removed through the umbilical port site.      The working ports were removed under direct visualization, with no evidence of any bleeding.  The abdomen was desufflated and the umbilical site fascia was closed with an interrupted 0 Vicryl suture in a figure-of-eight configuration.  The skin was closed with 4-0 Vicryl sutures.  Steri-Strips and a sterile dressing were applied.      All relevant counts were correct as reported to me.      The patient tolerated the procedure without difficulty and transferred to recovery room in stable condition.      Dr. Santiago was present for the entire procedure.         GENEVIEVE SANTIAGO MD       As dictated by JEREMY JO MD            D: 2017 23:30   T: 2017 00:12   MT: GOLDIE      Name:     BALJIT DUEÑAS   MRN:      [id number]        Account:        [id number]   :      1990           Procedure Date: 2017      Document: [id number]

## 2017-03-28 NOTE — PLAN OF CARE
Problem: Discharge Planning  Goal: Discharge Planning (Adult, OB, Behavioral, Peds)  Outpatient/Observation goals to be met before discharge home:     1.)  Tolerating regular diet - Yes  2.)  Pain controlled on PO pain meds - Yes  3.)  Ambulating without difficulty - Yes  4.)  Urinating spontaneously - Yes

## 2017-03-28 NOTE — DISCHARGE SUMMARY
[facility]  Discharge Summary  Colon and Rectal Surgery     Laura Wilkes MRN# [id number]   YOB: 1990 Age: 26 year old     Date of Admission:  3/27/2017  Date of Discharge::  3/28/2017  Admitting Physician:  Cathi Nunez MD  Discharge Physician:  Cathi Nunez MD  Primary Care Physician:        Bailey Jasmine          Admission Diagnoses:   Acute appendicitis with localized peritonitis [K35.3]          Discharge Diagnosis:   Acute appendicitis with localized peritonitis s/p appendectomy         Procedures:   Laparoscopic appendectomy           Consultations:   None         Imaging Studies:     Results for orders placed or performed during the hospital encounter of 03/27/17   CT Abdomen Pelvis w Contrast     Value    Radiologist flags Appendicitis (Urgent)    Narrative    EXAMINATION: CT ABDOMEN PELVIS W CONTRAST, 3/27/2017 3:00 PM    TECHNIQUE:  Helical CT images from the lung bases through the  symphysis pubis were obtained with IV contrast. Contrast dose:  iopamidol (ISOVUE-370) solution 85 mL    COMPARISON: None    HISTORY: RLQ pain- eval for appy    FINDINGS:    Abdomen and pelvis: The appendix is abnormally dilated and  stool-filled measuring up to 1.6-1.7 cm on series 2 image 58 and  coronal image 27. The appendix is best visualized on coronal images  19-39. There are mild periappendiceal inflammatory changes best  visualized on coronal image 23. No definite perforation although  isodense right ovary makes pelvic evaluation difficult.    Mild periportal edema. Cholelithiasis. The spleen has a few tiny  calcified foci compatible with prior granulomatous disease. The  pancreas, adrenals, and kidneys are unremarkable. No abnormally  dilated loops of large or small bowel. Trace amount of free fluid in  the pelvis, likely physiologic. Normal abdominal aorta. No abnormal  retroperitoneal or pelvic lymphadenopathy.    Lung bases:  Clear.    Bones and soft tissues: Benign-appearing limits chronic foci in the  left ilium on coronal image 45.      Impression    IMPRESSION:   1. Appendicitis.  2. Cholelithiasis.     [Urgent Result: Appendicitis]    Finding was identified on 3/27/2017 3:10 PM.     Dr. Warner was contacted by Dr. Brush at 3/27/2017 3:14 PM and  verbalized understanding of the urgent finding.     I have personally reviewed the examination and initial interpretation  and I agree with the findings.    BELLA CHUNG MD              Medications Prior to Admission:     Prescriptions Prior to Admission   Medication Sig Dispense Refill Last Dose     terbinafine (LAMISIL) 250 MG tablet Take 1 tablet (250 mg) by mouth daily 14 tablet 0 3/26/2017 at 0900     econazole nitrate 1 % cream Apply once per day for 3 weeks 30 g 1 3/26/2017 at 2000     albuterol (ALBUTEROL) 108 (90 BASE) MCG/ACT inhaler Inhale 1-2 puffs into the lungs every 6 hours as needed for shortness of breath / dyspnea 1 Inhaler 6 Past Month at Unknown time     ciclopirox (LOPROX) 0.77 % cream Apply topically 2 times daily 30 g 1 Unknown at Unknown time              Discharge Medications:     Current Discharge Medication List      START taking these medications    Details   oxyCODONE (ROXICODONE) 5 MG IR tablet Take 1-2 tablets (5-10 mg) by mouth every 6 hours as needed for moderate to severe pain  Qty: 20 tablet, Refills: 0    Associated Diagnoses: Acute appendicitis with localized peritonitis         CONTINUE these medications which have NOT CHANGED    Details   terbinafine (LAMISIL) 250 MG tablet Take 1 tablet (250 mg) by mouth daily  Qty: 14 tablet, Refills: 0    Associated Diagnoses: Tinea corporis      econazole nitrate 1 % cream Apply once per day for 3 weeks  Qty: 30 g, Refills: 1    Associated Diagnoses: Tinea corporis      albuterol (ALBUTEROL) 108 (90 BASE) MCG/ACT inhaler Inhale 1-2 puffs into the lungs every 6 hours as needed for shortness of breath /  "dyspnea  Qty: 1 Inhaler, Refills: 6    Associated Diagnoses: Intermittent asthma      ciclopirox (LOPROX) 0.77 % cream Apply topically 2 times daily  Qty: 30 g, Refills: 1    Associated Diagnoses: Tinea corporis                    Brief History of Illness:     Laura Wilkes is a 26-year-old female who was admitted on 3/27 with abdominal pain. The pain  Was described as an 8/10 at its worst, worsened  with movement, non radiating unrelieved with antacid medication. She reported associated nausea and an episode of vomiting without hematemesis. She denies diarrhea/constipation. She has had a fever/chills since yesterday afternoon.  Abdominal examination revealed tnederness at [place] point and CT was consistent with diagnosis of appendicitis without evidence of perforation.           Hospital Course:     Ms. Wilkes underwent a laparoscopic appendectomy on 3/27 and was transferred to observation unit after her procedure. She had an uneventful postoperative course and on day of discharge is able to tolerate a regular diet w/o any nausea or vomiting, has adequate pain control with oral pain medications and is spontaneously voiding.            Day of Discharge Physical Exam:   Blood pressure 99/55, pulse 65, temperature 98.1  F (36.7  C), temperature source Oral, resp. rate 14, height 1.676 m (5' 6\"), weight 62.6 kg (138 lb), last menstrual period 03/17/2017, SpO2 99 %, not currently breastfeeding.    Gen: AAOx3, NAD  Pulm: Non-labored breathing  Abd: Soft, appropriately tender   Incision C/D/I   Ext:  Warm and well-perfused         Final Pathology Result:   Pending         Discharge Instructions and Follow-Up:                            Discharge Procedure Orders  Reason for your hospital stay   Order Comments: Appendicitis     Adult [facility] Follow-up and recommended labs and tests   Order Comments: Phone call followup with patient and if patient wants to can follow up with Dr. Nunez    Appointments on " [place] and/or [facility] (with [facility] or [facility] provider or service). Call [phone] if you haven't heard regarding these appointments within 7 days of discharge.     Activity   Order Comments: Your activity upon discharge: activity as tolerated, no heavy lifting >15 lbs for next 6 weeks   Order Specific Question Answer Comments   Is discharge order? Yes      Discharge Instructions   Order Comments: 1. No driving while using narcotics for pain control  2. Narcotic pain medications may cause constipation. Use over the counter fiber supplements and miralax if constipated  3. Use tylenol and ibuprofen to control pain as well  4. No heavy lifting for next 4-6 weeks  5. You can shower but avoid soaking in bath tub until incisions completely healed     Full Code     Diet   Order Comments: Follow this diet upon discharge: Orders Placed This Encounter     Regular Diet Adult   Order Specific Question Answer Comments   Is discharge order? Yes                      Discharge Disposition:   Discharged to home      Condition at discharge: Stable      CIELO ROTH MD  General Surgery Resident PGY-1    Pt was seen and discussed with Dr. Nunez on 3/28/2017

## 2017-03-28 NOTE — BRIEF OP NOTE
[facility], [place]    Brief Operative Note    Pre-operative diagnosis: Acute appendicitis  Post-operative diagnosis Gangrenous appendicitis  Procedure: Procedure(s):  Laparoscopic appendectomy  - Wound Class: III-Contaminated  Surgeon: Surgeon(s) and Role:     * Cathi Nunez MD - Primary     * Sergio Aly MD - Assisting  Anesthesia: General   Estimated blood loss: 10 mL  Drains: None  Specimens:   ID Type Source Tests Collected by Time Destination   A :  Tissue Appendix SURGICAL PATHOLOGY EXAM Cathi Nunez MD 3/27/2017 11:01 PM      Findings: Gangrenous, non-perforated appendix  Complications:  None immediately

## 2017-03-28 NOTE — ANESTHESIA CARE TRANSFER NOTE
Patient: Laura Dueñas    Procedure(s):  LAPAROTOMY EXPLORATORY  - Wound Class: III-Contaminated    Diagnosis: Apendicitis  Diagnosis Additional Information: No value filed.    Anesthesia Type:   General, RSI, ETT     Note:  Airway :Face Mask    Comments: LAURA DUEÑAS was transferred to PACU, breathing from face mask 6 L O2.   Oxygen saturation 100%  /70  HR 88  RR 16  Pain well controlled, no nausea.     Naga Pearson MD CA1  March 27, 2017 11:38 PM      Vitals: (Last set prior to Anesthesia Care Transfer)    CRNA VITALS  3/27/2017 2300 - 3/27/2017 2338      3/27/2017             SpO2: 100 %    Resp Rate (observed): (!)  1                Electronically Signed By: Naga Pearson MD  March 27, 2017  11:38 PM

## 2017-03-29 ENCOUNTER — CARE COORDINATION (OUTPATIENT)
Dept: SURGERY | Facility: CLINIC | Age: 27
End: 2017-03-29

## 2017-03-29 LAB — COPATH REPORT: NORMAL

## 2017-03-29 NOTE — PROGRESS NOTES
RN Post Op Care Coordination Note    Ms. Laura Wilkes is a 26 year old female who underwent an emergent appendectomy with discharge from the hospital yesterday, 3/28. Spoke with Patient.    Support  Patient able to care for self independently     Health Status  Fevers/chills: Patient denies any fever or chills.  Nausea/Vomiting: Patient denies nausea/vomiting.  Eating/drinking: Patient is able to eat and drink without any complaints.  Bowel habits: Patient reports constipation.  Last BM was 3/27, 2 days ago.  She is passing gas and took one dose of Miralax yesterday.  Asked to repeat dose (per package instructions) tonight if needed.    Urinary: Voiding normally  Drains (KELSY): N/A  Incisions: Patient denies any signs and symptoms of infection. Reports that topper and steri-strips intact.  Will remove outer dressing when showers.  No soaking until strips off (7-10 days)  Pain: None.  Has not needed any analgesics.  Discussed used to OTC Tylenol/Ibuprofen if needed.    Activity/Restrictions  Other No lifting in excess of 20 pounds x 4 weeks .    Equipment  other None    Pathology reviewed with patient:  No: Not yet available    All of her questions were answered including reviewing restrictions and wound care.  She will call this office if she has any further questions and/or concerns.  Contact information provided.    In lieu of a post-op clinic patient that patient would like to be contacted in approximately 10 days via telephone.    Whom and When to Call  Patient acknowledges understanding of how to manage any medication changes and when to seek medical care.     Patient advised that if after hour medical concerns arise to please call [phone] and choose option 4 to speak to the physician on call.     A copy of this note was routed to the primary surgeon.

## 2017-04-04 ENCOUNTER — TELEPHONE (OUTPATIENT)
Dept: FAMILY MEDICINE | Facility: CLINIC | Age: 27
End: 2017-04-04

## 2017-04-04 DIAGNOSIS — B35.4 TINEA CORPORIS: ICD-10-CM

## 2017-04-04 RX ORDER — TERBINAFINE HYDROCHLORIDE 250 MG/1
250 TABLET ORAL DAILY
Qty: 14 TABLET | Refills: 0 | Status: SHIPPED | OUTPATIENT
Start: 2017-04-04 | End: 2017-06-01

## 2017-04-04 NOTE — TELEPHONE ENCOUNTER
Reason for Call:  Medication or medication refill:    Do you use a [facility]?  Name of the pharmacy and phone number for the current request:  [facility] [address] [place], [place] - [address] [place] [address] & [address]    Name of the medication requested: terbinafine (LAMISIL) 250 MG tablet      Other request: NA    Can we leave a detailed message on this number? YES    Phone number patient can be reached at: Home number on file [phone] (home)    Best Time: any    Call taken on 4/4/2017 at 9:59 AM by Adrianna Gallagher

## 2017-04-04 NOTE — TELEPHONE ENCOUNTER
patient states that the rash is almost gone and would like a refill of the Lamisil.    Ok x 1 per Dr. Weldon.  Lashawn Davisno RN  [facility]  [phone]

## 2017-04-07 ENCOUNTER — CARE COORDINATION (OUTPATIENT)
Dept: SURGERY | Facility: CLINIC | Age: 27
End: 2017-04-07

## 2017-04-07 NOTE — PROGRESS NOTES
Laura Wilkes is a patient of Dr. Genevieve Santiago that recently underwent a surgical procedure.  The patient was contacted via telephone approximately 10 days ago for a status update and post-op teaching.  In lieu of a clinic visit, that patient requested to be contacted at a later date by an RN for an assessment.    Attempted to contact patient via telephone for a status update.  LM on VM to call office.        Patient Name: LAURA WILKES   MR#: [id number]   Specimen #: [id number]   Collected: 3/27/2017   Received: 3/27/2017   Reported: 3/29/2017 15:56   Ordering Phy(s): GENEVIEVE SANTIAGO     For improved result formatting, select 'View Enhanced Report Format'   under Linked Documents section.     SPECIMEN(S):   Appendix     FINAL DIAGNOSIS:   APPENDIX, APPENDECTOMY:   - Acute necrotizing appendicitis with acute serositis     I have personally reviewed all specimens and or slides, including the   listed special stains, and used them with my medical judgement to   determine the final diagnosis.     Electronically signed out by:     Bayron Rodney M.D

## 2017-04-11 NOTE — PROGRESS NOTES
Laura Atkinsonakkailyn was contacted several days post procedure via telephone for a status update and elected to have a telephone follow -up call approximately 10 days after original contact in lieu of a clinic visit with Dr. Cathi Nunez.  She continues to do well and the steri-strips have mostly peeled off.  The patients wounds are healed and the area is C/D/I.  She is afebrile, pain free, and sai po; the patient is slowly resuming her normal activity. Discussed lifting and activity restrictions for another week.    Pathology was reviewed with the patient: Yes: discussed    All of Laura Ceecheri question's were answered and  she would like to return to the clinic on a PRN basis.      A copy of this note was routed to his surgeon.

## 2017-04-17 DIAGNOSIS — B35.4 TINEA CORPORIS: ICD-10-CM

## 2017-04-17 NOTE — TELEPHONE ENCOUNTER
Reason for Call:  Medication or medication refill:    Do you use a [facility]?  Name of the pharmacy and phone number for the current request:  [facility] [address] [place], [place] - [address] [place] [address] & [address]    Name of the medication requested: terbinafine (LAMISIL) 250 MG tablet      Other request: NA    Can we leave a detailed message on this number? YES    Phone number patient can be reached at: Home number on file [phone] (home)    Best Time: any    Call taken on 4/17/2017 at 10:59 AM by Adrianna Gallagher

## 2017-04-19 RX ORDER — TERBINAFINE HYDROCHLORIDE 250 MG/1
250 TABLET ORAL DAILY
Qty: 14 TABLET | Refills: 0 | OUTPATIENT
Start: 2017-04-19

## 2017-04-19 NOTE — TELEPHONE ENCOUNTER
Sent info to pharmacy  Left message on answering machine for patient to call back and schedule appointment - no refills        Cindi Weldon MD   En Nurse Pool 2 days ago                  She needs a recheck, because this would be 6 weeks of terbinafine (Routing comment)             Lashawn Thoemke,RN  [facility]  [phone]

## 2017-04-25 ENCOUNTER — OFFICE VISIT (OUTPATIENT)
Dept: FAMILY MEDICINE | Facility: CLINIC | Age: 27
End: 2017-04-25
Payer: COMMERCIAL

## 2017-04-25 DIAGNOSIS — B35.4 TINEA CORPORIS: Primary | ICD-10-CM

## 2017-04-25 PROCEDURE — 99213 OFFICE O/P EST LOW 20 MIN: CPT | Performed by: FAMILY MEDICINE

## 2017-04-25 RX ORDER — TERBINAFINE HYDROCHLORIDE 250 MG/1
250 TABLET ORAL DAILY
Qty: 30 TABLET | Refills: 0 | Status: SHIPPED | OUTPATIENT
Start: 2017-04-25 | End: 2017-08-21

## 2017-04-25 RX ORDER — CICLOPIROX OLAMINE 7.7 MG/G
CREAM TOPICAL
Refills: 0 | COMMUNITY
Start: 2017-01-31 | End: 2017-08-21

## 2017-04-25 NOTE — PATIENT INSTRUCTIONS
FUTURE APPOINTMENTS  Follow up as needed if symptoms not resolving.    ORAL ANTIFUNGAL  Take by mouth 1 tablet of terbinafine (Lamisil) 250 mg one time(s) a day for 4 weeks. Make sure to finish the entire antifungal regimen.    TOPICAL ANTIFUNGAL  Apply OTC terbinafine (Lamisil) 1% cream generously (half an inch beyond the borders) two time(s) a day for three weeks to the affected areas.      Avoid scratching or shaving legs until symptoms have resolved. Skin color changes may take longer to fade, but no new areas should be developing.

## 2017-04-25 NOTE — PROGRESS NOTES
[facility] - PRIMARY CARE SKIN    CC : Rash   SUBJECTIVE:                                                    Laura Wilkes is a 26 year old female who presents to clinic today for follow-up of tinea corporis beginning months ago. While initial areas of rash have begun to clear with residual skin color changes, areas of involvement have now occurred on the arms and legs.    Current treatment : oral terbinafine 250 mg QD begun 4/4/17 for 2 weeks, topical ciclopirox 0.77% cream begun in Jan 2017, topical econazole 1% cream began 2 months ago.  Response to treatment : symptoms not resolved, new patches developing    Recent exposure history : none known - teaches in school. She works out at gyms regularly.  Any other family members with similar symptoms : YES - she reports that her mother has a spot on the face now.    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    Patient Active Problem List   Diagnosis     Family history of melanoma     Asthma, exercise induced     Left knee pain     Acute appendicitis       Past Medical History:   Diagnosis Date     Asthma exacerbation, mild 1995     Family history of melanoma     Past Surgical History:   Procedure Laterality Date     APPENDECTOMY N/A      LAPAROSCOPIC APPENDECTOMY N/A 3/27/2017    Procedure: LAPAROSCOPIC APPENDECTOMY;  Surgeon: Cathi Nunez MD;  Location:  OR      Social History   Substance Use Topics     Smoking status: Never Smoker     Smokeless tobacco: Never Used     Alcohol use No      Comment: 3-4 drinks week    Family History     Problem (# of Occurrences) Relation (Name,Age of Onset)    Melanoma (1) Mother (52)           Prescription Medications as of 4/25/2017             ciclopirox (LOPROX) 0.77  % cream KARINA EXT AA BID    terbinafine (LAMISIL) 250 MG tablet Take 1 tablet (250 mg) by mouth daily    albuterol (ALBUTEROL) 108 (90 BASE) MCG/ACT inhaler Inhale 1-2 puffs into the lungs every 6 hours as needed for shortness of breath / dyspnea    terbinafine (LAMISIL) 250 MG tablet Take 1 tablet (250 mg) by mouth daily    oxyCODONE (ROXICODONE) 5 MG IR tablet Take 1-2 tablets (5-10 mg) by mouth every 6 hours as needed for moderate to severe pain    econazole nitrate 1 % cream Apply once per day for 3 weeks          No Known Allergies     INTEGUMENTARY/SKIN: POSITIVE for pruritis and rash  ROS : 14 point review of systems was negative except the symptoms listed above in the HPI.    This document serves as a record of the services and decisions personally performed and made by Nakita Weldon MD. It was created on her behalf by Lico Delatorre, a trained medical scribe.  The creation of this document is based on the scribe's personal observations and the provider's statements to the medical scribe.  Lico Delatorre, April 25, 2017 7:51 AM      OBJECTIVE:                                                    GENERAL: healthy, alert and no distress  SKIN: Wiley Skin Type - II.  Legs were examined. The dermatoscope was used to help evaluate pigmented lesions.  Skin Pertinent Findings:  Arms : Scattered 6 mm - 8 mm in size papular patches    Lower legs : Scattered areas of residual hyperpigmentation and new scattered 1 cm in size patches of maculopapular eruption    Diagnostic Test Results:  none           ASSESSMENT:                                                      Encounter Diagnosis   Name Primary?     Tinea corporis Yes         PLAN:                                                    Patient Instructions   FUTURE APPOINTMENTS  Follow up as needed if symptoms not resolving.    ORAL ANTIFUNGAL  Take by mouth 1 tablet of terbinafine (Lamisil) 250 mg one time(s) a day for 4 weeks. Make sure to finish the entire antifungal  regimen.    TOPICAL ANTIFUNGAL  Apply OTC terbinafine (Lamisil) 1% cream generously (half an inch beyond the borders) two time(s) a day for three weeks to the affected areas.      Avoid scratching or shaving legs until symptoms have resolved. Skin color changes may take longer to fade, but no new areas should be developing.        PROCEDURES:                                                    None.    TT : 20 minutes.  CT : 15 minutes.      The information in this document, created by the medical scribe for me, accurately reflects the services I personally performed and the decisions made by me. I have reviewed and approved this document for accuracy prior to leaving the patient care area.  Nakita Weldon MD April 25, 2017 7:51 AM  [facility] - PRIMARY CARE SKIN

## 2017-04-25 NOTE — MR AVS SNAPSHOT
After Visit Summary   4/25/2017    Laura Wilkes    MRN: [id number]           Patient Information     Date Of Birth          1990        Visit Information        Provider Department      4/25/2017 7:40 AM Cindi Weldon MD [facility]        Today's Diagnoses     Tinea corporis    -  1      Care Instructions    FUTURE APPOINTMENTS  Follow up as needed if symptoms not resolving.    ORAL ANTIFUNGAL  Take by mouth 1 tablet of terbinafine (Lamisil) 250 mg one time(s) a day for 4 weeks. Make sure to finish the entire antifungal regimen.    TOPICAL ANTIFUNGAL  Apply OTC terbinafine (Lamisil) 1% cream generously (half an inch beyond the borders) two time(s) a day for three weeks to the affected areas.      Avoid scratching or shaving legs until symptoms have resolved. Skin color changes may take longer to fade, but no new areas should be developing.        Follow-ups after your visit        Who to contact     If you have questions or need follow up information about today's clinic visit or your schedule please contact [facility] directly at [phone].  Normal or non-critical lab and imaging results will be communicated to you by [email or web address], letter or phone within 4 business days after the clinic has received the results. If you do not hear from us within 7 days, please contact the clinic through [email or web address] or phone. If you have a critical or abnormal lab result, we will notify you by phone as soon as possible.  Submit refill requests through Solutionreach or call your pharmacy and they will forward the refill request to us. Please allow 3 business days for your refill to be completed.          Additional Information About Your Visit        MyChart Information     Solutionreach lets you send messages to your doctor, view your test results, renew your prescriptions, schedule appointments and more. To sign up, go to [email or web address] . Click on  "\"Log in\" on the left side of the screen, which will take you to the Welcome page. Then click on \"Sign up Now\" on the right side of the page.     You will be asked to enter the access code listed below, as well as some personal information. Please follow the directions to create your username and password.     Your access code is: [email or web address]  Expires: 2017 10:57 AM     Your access code will  in 90 days. If you need help or a new code, please call your [place] clinic or [phone].        Care EveryWhere ID     This is your Care EveryWhere ID. This could be used by other organizations to access your [place] medical records  [id number]        Your Vitals Were     Last Period                   2017 (Exact Date)            Blood Pressure from Last 3 Encounters:   17 99/55   17 106/64   17 104/72    Weight from Last 3 Encounters:   17 138 lb (62.6 kg)   17 139 lb (63 kg)   17 138 lb 6.4 oz (62.8 kg)              Today, you had the following     No orders found for display         Today's Medication Changes          These changes are accurate as of: 17  8:02 AM.  If you have any questions, ask your nurse or doctor.               These medicines have changed or have updated prescriptions.        Dose/Directions    * terbinafine 250 MG tablet   Commonly known as:  lamISIL   This may have changed:  Another medication with the same name was added. Make sure you understand how and when to take each.   Used for:  Tinea corporis   Changed by:  Cindi Weldon MD        Dose:  250 mg   Take 1 tablet (250 mg) by mouth daily   Quantity:  14 tablet   Refills:  0       * terbinafine 250 MG tablet   Commonly known as:  lamISIL   This may have changed:  You were already taking a medication with the same name, and this prescription was added. Make sure you understand how and when to take each.   Used for:  Tinea corporis   Changed by:  Cindi Weldon, " MD        Dose:  250 mg   Take 1 tablet (250 mg) by mouth daily   Quantity:  30 tablet   Refills:  0       * Notice:  This list has 2 medication(s) that are the same as other medications prescribed for you. Read the directions carefully, and ask your doctor or other care provider to review them with you.         Where to get your medicines      These medications were sent to [id number] - [facility] [address]  [address] [address]     Phone:  [phone]     terbinafine 250 MG tablet                Primary Care Provider Office Phone # Fax #    SIGRID Carbajal [facility] [phone]       [facility] [address] [facility] [address]        Thank you!     Thank you for choosing [facility]  for your care. Our goal is always to provide you with excellent care. Hearing back from our patients is one way we can continue to improve our services. Please take a few minutes to complete the written survey that you may receive in the mail after your visit with us. Thank you!             Your Updated Medication List - Protect others around you: Learn how to safely use, store and throw away your medicines at www.disposemymeds.org.          This list is accurate as of: 4/25/17  8:02 AM.  Always use your most recent med list.                   Brand Name Dispense Instructions for use    albuterol 108 (90 BASE) MCG/ACT Inhaler    albuterol    1 Inhaler    Inhale 1-2 puffs into the lungs every 6 hours as needed for shortness of breath / dyspnea       ciclopirox 0.77 % cream    LOPROX     KARINA EXT AA BID       econazole nitrate 1 % cream     30 g    Apply once per day for 3 weeks       oxyCODONE 5 MG IR tablet    ROXICODONE    20 tablet    Take 1-2 tablets (5-10 mg) by mouth every 6 hours as needed for moderate to severe pain       * terbinafine 250 MG tablet    lamISIL    14 tablet    Take 1 tablet  (250 mg) by mouth daily       * terbinafine 250 MG tablet    lamISIL    30 tablet    Take 1 tablet (250 mg) by mouth daily       * Notice:  This list has 2 medication(s) that are the same as other medications prescribed for you. Read the directions carefully, and ask your doctor or other care provider to review them with you.

## 2017-05-09 ENCOUNTER — TELEPHONE (OUTPATIENT)
Dept: FAMILY MEDICINE | Facility: CLINIC | Age: 27
End: 2017-05-09

## 2017-05-09 NOTE — TELEPHONE ENCOUNTER
5/9/2017    Call Regarding Preventive Health Screening Cervical/PAP    Attempt 1    Message     Comments: hung up          Outreach   ALLAN

## 2017-05-19 NOTE — TELEPHONE ENCOUNTER
5/19/2017     Call Regarding Preventive Health Screening Cervical/PAP  Attempt 2     Message on voicemail   Comments:         Outreach   VANNA

## 2017-05-22 ENCOUNTER — TELEPHONE (OUTPATIENT)
Dept: FAMILY MEDICINE | Facility: CLINIC | Age: 27
End: 2017-05-22

## 2017-05-22 DIAGNOSIS — B35.4 TINEA CORPORIS: Primary | ICD-10-CM

## 2017-05-22 RX ORDER — FLUCONAZOLE 150 MG/1
TABLET ORAL
Qty: 8 TABLET | Refills: 0 | Status: SHIPPED | OUTPATIENT
Start: 2017-05-22 | End: 2017-08-21

## 2017-05-22 NOTE — TELEPHONE ENCOUNTER
Oral antifungals will be done on Friday- patient states she still has a visiable outbreak- has not spread any more and seems to be clearing-   just not completely gone and very worried that this will come back if she doesn't extend the antibiotics.    Patient worried that this needs to be extended or is willing to try a different treatment whatever Dr. Weldon advises  Patient concerned that this will start spreading again when she is done this current prescription    Please advise,    Lashawn Davison RN  [facility]  [phone]

## 2017-05-22 NOTE — TELEPHONE ENCOUNTER
Please call and let her know that I will change to a different class of antifungals. Fluconazole 300 mg ONCE PER WEEK for 4 weeks.

## 2017-06-01 ENCOUNTER — OFFICE VISIT (OUTPATIENT)
Dept: FAMILY MEDICINE | Facility: CLINIC | Age: 27
End: 2017-06-01
Payer: COMMERCIAL

## 2017-06-01 VITALS
OXYGEN SATURATION: 98 % | WEIGHT: 134 LBS | SYSTOLIC BLOOD PRESSURE: 102 MMHG | TEMPERATURE: 97.6 F | HEART RATE: 76 BPM | RESPIRATION RATE: 16 BRPM | DIASTOLIC BLOOD PRESSURE: 64 MMHG | BODY MASS INDEX: 21.63 KG/M2

## 2017-06-01 DIAGNOSIS — J02.9 ACUTE PHARYNGITIS, UNSPECIFIED ETIOLOGY: Primary | ICD-10-CM

## 2017-06-01 LAB
DEPRECATED S PYO AG THROAT QL EIA: NORMAL
MICRO REPORT STATUS: NORMAL
SPECIMEN SOURCE: NORMAL

## 2017-06-01 PROCEDURE — 99213 OFFICE O/P EST LOW 20 MIN: CPT | Performed by: NURSE PRACTITIONER

## 2017-06-01 PROCEDURE — 87081 CULTURE SCREEN ONLY: CPT | Performed by: NURSE PRACTITIONER

## 2017-06-01 PROCEDURE — 87880 STREP A ASSAY W/OPTIC: CPT | Performed by: NURSE PRACTITIONER

## 2017-06-01 NOTE — NURSING NOTE
"Chief Complaint   Patient presents with     Pharyngitis       Initial /64 (BP Location: Right arm, Patient Position: Chair, Cuff Size: Adult Regular)  Pulse 76  Temp 97.6  F (36.4  C) (Tympanic)  Resp 16  Wt 134 lb (60.8 kg)  SpO2 98%  BMI 21.63 kg/m2 Estimated body mass index is 21.63 kg/(m^2) as calculated from the following:    Height as of 3/28/17: 5' 6\" (1.676 m).    Weight as of this encounter: 134 lb (60.8 kg).  Medication Reconciliation: complete     Nohelia Young, ROGELIO    "

## 2017-06-01 NOTE — MR AVS SNAPSHOT
"              After Visit Summary   6/1/2017    Laura Wilkes    MRN: [id number]           Patient Information     Date Of Birth          1990        Visit Information        Provider Department      6/1/2017 10:00 AM Bailey Villatoro APRN CNP [facility]        Today's Diagnoses     Acute pharyngitis, unspecified etiology    -  1      Care Instructions    Strep test is negative.  Nausea likely related to fluconazole, should improve once treatment is completed.  Sore throat may be related to allergies?  Try daily antihistamine like allegra, zyrtec, or claritin once a day (may tae 2 weeks to see results) or flonase/nasocort steroid nasal spray.  Consider humidifier at night.  Follow up if not improving in 2 weeks.          Follow-ups after your visit        Who to contact     If you have questions or need follow up information about today's clinic visit or your schedule please contact [facility] directly at [phone].  Normal or non-critical lab and imaging results will be communicated to you by SportIDhart, letter or phone within 4 business days after the clinic has received the results. If you do not hear from us within 7 days, please contact the clinic through [employer] Solutionst or phone. If you have a critical or abnormal lab result, we will notify you by phone as soon as possible.  Submit refill requests through Shoplocal or call your pharmacy and they will forward the refill request to us. Please allow 3 business days for your refill to be completed.          Additional Information About Your Visit        SportIDhart Information     Shoplocal lets you send messages to your doctor, view your test results, renew your prescriptions, schedule appointments and more. To sign up, go to [email or web address] . Click on \"Log in\" on the left side of the screen, which will take you to the Welcome page. Then click on \"Sign up Now\" on the right side of the page.     You will be asked to enter " the access code listed below, as well as some personal information. Please follow the directions to create your username and password.     Your access code is: [email or web address]  Expires: 2017 10:34 AM     Your access code will  in 90 days. If you need help or a new code, please call your [place] clinic or [phone].        Care EveryWhere ID     This is your Care EveryWhere ID. This could be used by other organizations to access your [place] medical records  [id number]        Your Vitals Were     Pulse Temperature Respirations Pulse Oximetry BMI (Body Mass Index)       76 97.6  F (36.4  C) (Tympanic) 16 98% 21.63 kg/m2        Blood Pressure from Last 3 Encounters:   17 102/64   17 99/55   17 106/64    Weight from Last 3 Encounters:   17 134 lb (60.8 kg)   17 138 lb (62.6 kg)   17 139 lb (63 kg)              We Performed the Following     Strep, Rapid Screen        Primary Care Provider Office Phone # Fax #    BaileySIGRID Randle [facility] [phone]       [address] [address]        Thank you!     Thank you for choosing [facility]  for your care. Our goal is always to provide you with excellent care. Hearing back from our patients is one way we can continue to improve our services. Please take a few minutes to complete the written survey that you may receive in the mail after your visit with us. Thank you!             Your Updated Medication List - Protect others around you: Learn how to safely use, store and throw away your medicines at www.disposemymeds.org.          This list is accurate as of: 17 10:34 AM.  Always use your most recent med list.                   Brand Name Dispense Instructions for use    albuterol 108 (90 BASE) MCG/ACT Inhaler    albuterol    1 Inhaler    Inhale 1-2 puffs into the lungs every 6 hours as needed for shortness of breath / dyspnea       ciclopirox 0.77 %  cream    LOPROX     KARINA EXT AA BID       fluconazole 150 MG tablet    DIFLUCAN    8 tablet    2 tab po q week times 4 weeks       terbinafine 250 MG tablet    lamISIL    30 tablet    Take 1 tablet (250 mg) by mouth daily

## 2017-06-01 NOTE — PROGRESS NOTES
SUBJECTIVE:                                                    Laura Wilkes is a 26 year old female who presents to clinic today for the following health issues:    RESPIRATORY SYMPTOMS      Duration: 1 week    Description  sore throat and nausea     Severity: moderate    Accompanying signs and symptoms: pt notes that she has been taking new med, antifungal    History (predisposing factors):  strep exposure, exercise induced asthma    Precipitating or alleviating factors: None    Therapies tried and outcome:  none     Started on fluconazole 5/22/17 for tinea corpis.  Previously was on lamasil, changed due to lamisil was not working.  Fluconazole seems to be helping with rash.    Students with strep recently.    Mild rhinitis, no congestion, may be seasonal allergies.  Sore throat, pretty significant.  No cough.  Mild fever yesterday of 99.3.  Nausea, no emesis, appetite is down.  No abdominal pain, no change in bowel habits.    Competitive .    Patient Active Problem List   Diagnosis     Family history of melanoma     Asthma, exercise induced     Left knee pain     Acute appendicitis     Past Surgical History:   Procedure Laterality Date     APPENDECTOMY N/A      LAPAROSCOPIC APPENDECTOMY N/A 3/27/2017    Procedure: LAPAROSCOPIC APPENDECTOMY;  Surgeon: Cathi Nunez MD;  Location:  OR       Social History   Substance Use Topics     Smoking status: Never Smoker     Smokeless tobacco: Never Used     Alcohol use No      Comment: 3-4 drinks week     Family History   Problem Relation Age of Onset     Melanoma Mother 52         Current Outpatient Prescriptions   Medication Sig Dispense Refill     fluconazole (DIFLUCAN) 150 MG tablet 2 tab po q week times 4 weeks 8 tablet 0     albuterol (ALBUTEROL) 108 (90 BASE) MCG/ACT inhaler Inhale 1-2 puffs into the lungs every 6 hours as needed for shortness of breath / dyspnea 1 Inhaler 6     ciclopirox (LOPROX) 0.77 % cream KARINA EXT AA BID  0      terbinafine (LAMISIL) 250 MG tablet Take 1 tablet (250 mg) by mouth daily (Patient not taking: Reported on 6/1/2017) 30 tablet 0       ROS:  Const, EENT, GI as above, otherwise negative       OBJECTIVE:                                                    /64 (BP Location: Right arm, Patient Position: Chair, Cuff Size: Adult Regular)  Pulse 76  Temp 97.6  F (36.4  C) (Tympanic)  Resp 16  Wt 134 lb (60.8 kg)  SpO2 98%  BMI 21.63 kg/m2   GENERAL APPEARANCE: healthy, alert and no distress  EYES: Eyes grossly normal to inspection and conjunctivae and sclerae normal  HENT: ear canals and TM's normal and nose and mouth without ulcers or lesions.  Tonsils +1 bilaterally without exudate  NECK: no adenopathy  RESP: lungs clear to auscultation - no rales, rhonchi or wheezes  CV: regular rates and rhythm, normal S1 S2, no S3 or S4 and no murmur, click or rub  PSYCH: mentation appears normal and affect normal/bright     Diagnostic test results:  Diagnostic Test Results:  Results for orders placed or performed in visit on 06/01/17 (from the past 24 hour(s))   Strep, Rapid Screen   Result Value Ref Range    Specimen Description Throat     Rapid Strep A Screen       NEGATIVE: No Group A streptococcal antigen detected by immunoassay, await   culture report.      Micro Report Status FINAL 06/01/2017         ASSESSMENT/PLAN:                                                    (J02.9) Acute pharyngitis, unspecified etiology  (primary encounter diagnosis)  Comment: RST neg, suspect viral v. allergies  Plan: Strep, Rapid Screen, Beta strep group A culture        Recommend trial of antihistamine or steroid nasal spray, follow up if not improving in 2 weeks.        See Patient Instructions    Bailey Villatoro, [facility]    Patient Instructions   Strep test is negative.  Nausea likely related to fluconazole, should improve once treatment is completed.  Sore throat may be related to allergies?  Try daily  antihistamine like allegra, zyrtec, or claritin once a day (may tae 2 weeks to see results) or flonase/nasocort steroid nasal spray.  Consider humidifier at night.  Follow up if not improving in 2 weeks.

## 2017-06-02 LAB
BACTERIA SPEC CULT: NORMAL
MICRO REPORT STATUS: NORMAL
SPECIMEN SOURCE: NORMAL

## 2017-06-21 NOTE — TELEPHONE ENCOUNTER
6/21/2017    Call Regarding Preventive Health Screening Cervical/PAP    Attempt 3    Message on voicemail     Comments:           Outreach   ALLAN

## 2017-08-12 ENCOUNTER — HEALTH MAINTENANCE LETTER (OUTPATIENT)
Age: 27
End: 2017-08-12

## 2017-08-21 ENCOUNTER — OFFICE VISIT (OUTPATIENT)
Dept: FAMILY MEDICINE | Facility: CLINIC | Age: 27
End: 2017-08-21
Payer: COMMERCIAL

## 2017-08-21 ENCOUNTER — TELEPHONE (OUTPATIENT)
Dept: FAMILY MEDICINE | Facility: CLINIC | Age: 27
End: 2017-08-21

## 2017-08-21 VITALS
RESPIRATION RATE: 16 BRPM | BODY MASS INDEX: 21.79 KG/M2 | DIASTOLIC BLOOD PRESSURE: 66 MMHG | WEIGHT: 135 LBS | TEMPERATURE: 97.9 F | OXYGEN SATURATION: 99 % | HEART RATE: 79 BPM | SYSTOLIC BLOOD PRESSURE: 104 MMHG

## 2017-08-21 DIAGNOSIS — S81.852A DOG BITE OF LOWER LEG, LEFT, INITIAL ENCOUNTER: ICD-10-CM

## 2017-08-21 DIAGNOSIS — Z30.09 BIRTH CONTROL COUNSELING: ICD-10-CM

## 2017-08-21 DIAGNOSIS — J45.20 INTERMITTENT ASTHMA, UNCOMPLICATED: ICD-10-CM

## 2017-08-21 DIAGNOSIS — F41.9 ANXIETY: ICD-10-CM

## 2017-08-21 DIAGNOSIS — Z01.419 ENCOUNTER FOR GYNECOLOGICAL EXAMINATION (GENERAL) (ROUTINE) WITHOUT ABNORMAL FINDINGS: Primary | ICD-10-CM
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
## 2017-08-21 PROCEDURE — 99395 PREV VISIT EST AGE 18-39: CPT | Performed by: NURSE PRACTITIONER

## 2017-08-21 PROCEDURE — G0145 SCR C/V CYTO,THINLAYER,RESCR: HCPCS | Performed by: NURSE PRACTITIONER

## 2017-08-21 PROCEDURE — 99213 OFFICE O/P EST LOW 20 MIN: CPT | Mod: 25 | Performed by: NURSE PRACTITIONER

## 2017-08-21 RX ORDER — FLUTICASONE PROPIONATE 110 UG/1
2 AEROSOL, METERED RESPIRATORY (INHALATION) 2 TIMES DAILY
Qty: 1 INHALER | Refills: 1 | Status: SHIPPED | OUTPATIENT
Start: 2017-08-21 | End: 2017-10-28

## 2017-08-21 RX ORDER — ALBUTEROL SULFATE 90 UG/1
1-2 AEROSOL, METERED RESPIRATORY (INHALATION) EVERY 6 HOURS PRN
Qty: 1 INHALER | Refills: 6 | Status: SHIPPED | OUTPATIENT
Start: 2017-08-21 | End: 2024-01-29

## 2017-08-21 NOTE — MR AVS SNAPSHOT
After Visit Summary   8/21/2017    Laura Wilkes    MRN: [id number]           Patient Information     Date Of Birth          1990        Visit Information        Provider Department      8/21/2017 2:40 PM Bailey Villatoro APRN [facility]        Today's Diagnoses     Encounter for gynecological examination (general) (routine) without abnormal findings    -  1    Intermittent asthma, uncomplicated        Birth control counseling        Anxiety          Care Instructions    1.  No rabies prophylaxis needed since animal can be observed.  If animal becomes sick in 10 days you need vaccine, call clinic.  tetanus is up to date.  Low risk for infection but follow up if increased selling, redness, pain, drainage   2.  Try flovent inhaler twice a day for asthma symptoms.  Use it every day, rinse mouth after  3.  See GYN for IUD  4.  Referral for therapist, check with counselor.  Follow up if worsening symptoms/you want to discuss medication  5.  Pap updated    Preventive Health Recommendations  Female Ages 26 - 39  Yearly exam:   See your health care provider every year in order to    Review health changes.     Discuss preventive care.      Review your medicines if you your doctor has prescribed any.    Until age 30: Get a Pap test every three years (more often if you have had an abnormal result).    After age 30: Talk to your doctor about whether you should have a Pap test every 3 years or have a Pap test with HPV screening every 5 years.   You do not need a Pap test if your uterus was removed (hysterectomy) and you have not had cancer.  You should be tested each year for STDs (sexually transmitted diseases), if you're at risk.   Talk to your provider about how often to have your cholesterol checked.  If you are at risk for diabetes, you should have a diabetes test (fasting glucose).  Shots: Get a flu shot each year. Get a tetanus shot every 10 years.   Nutrition:     Eat  at least 5 servings of fruits and vegetables each day.    Eat whole-grain bread, whole-wheat pasta and brown rice instead of white grains and rice.    Talk to your provider about Calcium and Vitamin D.     Lifestyle    Exercise at least 150 minutes a week (30 minutes a day, 5 days of the week). This will help you control your weight and prevent disease.    Limit alcohol to one drink per day.    No smoking.     Wear sunscreen to prevent skin cancer.    See your dentist every six months for an exam and cleaning.            Follow-ups after your visit        Additional Services     MENTAL HEALTH REFERRAL       Your provider has referred you to: Behavioral [facility] Providers Intake Scheduling [phone]  [email or web address]  Pt needs to establish with community therapist    All scheduling is subject to the client's specific insurance plan & benefits, provider/location availability, and provider clinical specialities.  Please arrive 15 minutes early for your first appointment and bring your completed paperwork.    Please be aware that coverage of these services is subject to the terms and limitations of your health insurance plan.  Call member services at your health plan with any benefit or coverage questions.            OB/GYN REFERRAL       Your provider has referred you to:  [facility]: [facility] [phone]   [email or web address]    Please be aware that coverage of these services is subject to the terms and limitations of your health insurance plan.  Call member services at your health plan with any benefit or coverage questions.      Please bring the following with you to your appointment:    (1) Any X-Rays, CTs or MRIs which have been performed.  Contact the facility where they were done to arrange for  prior to your scheduled appointment.   (2) List of current medications   (3) This referral request   (4) Any documents/labs given to you for this  "referral                  Who to contact     If you have questions or need follow up information about today's clinic visit or your schedule please contact [facility] directly at [phone].  Normal or non-critical lab and imaging results will be communicated to you by MyChart, letter or phone within 4 business days after the clinic has received the results. If you do not hear from us within 7 days, please contact the clinic through MyChart or phone. If you have a critical or abnormal lab result, we will notify you by phone as soon as possible.  Submit refill requests through Rad or call your pharmacy and they will forward the refill request to us. Please allow 3 business days for your refill to be completed.          Additional Information About Your Visit        [facility] Information     Rad lets you send messages to your doctor, view your test results, renew your prescriptions, schedule appointments and more. To sign up, go to [email or web address] . Click on \"Log in\" on the left side of the screen, which will take you to the Welcome page. Then click on \"Sign up Now\" on the right side of the page.     You will be asked to enter the access code listed below, as well as some personal information. Please follow the directions to create your username and password.     Your access code is: [email or web address]  Expires: 2017 10:34 AM     Your access code will  in 90 days. If you need help or a new code, please call your [place] clinic or [phone].        Care EveryWhere ID     This is your Care EveryWhere ID. This could be used by other organizations to access your [place] medical records  [id number]        Your Vitals Were     Pulse Temperature Respirations Pulse Oximetry BMI (Body Mass Index)       79 97.9  F (36.6  C) (Tympanic) 16 99% 21.79 kg/m2        Blood Pressure from Last 3 Encounters:   17 104/66   17 102/64   17 99/55    Weight from Last 3 Encounters: "   08/21/17 135 lb (61.2 kg)   06/01/17 134 lb (60.8 kg)   03/28/17 138 lb (62.6 kg)              We Performed the Following     Asthma Action Plan (AAP)     MENTAL HEALTH REFERRAL     OB/GYN REFERRAL     Pap imaged thin layer screen reflex to HPV if ASCUS - recommend age 25 - 29          Today's Medication Changes          These changes are accurate as of: 8/21/17  3:12 PM.  If you have any questions, ask your nurse or doctor.               Start taking these medicines.        Dose/Directions    fluticasone 110 MCG/ACT Inhaler   Commonly known as:  FLOVENT HFA   Used for:  Intermittent asthma, uncomplicated   Started by:  Bailey Villatoro APRN CNP        Dose:  2 puff   Inhale 2 puffs into the lungs 2 times daily   Quantity:  1 Inhaler   Refills:  1         Stop taking these medicines if you haven't already. Please contact your care team if you have questions.     ciclopirox 0.77 % cream   Commonly known as:  LOPROX   Stopped by:  Bailey Villatoro APRN CNP           fluconazole 150 MG tablet   Commonly known as:  DIFLUCAN   Stopped by:  Bailey Villatoro APRN CNP           terbinafine 250 MG tablet   Commonly known as:  lamISIL   Stopped by:  Bailey Villatoro APRN CNP                Where to get your medicines      These medications were sent to [facility] [address]  [address] [facility] [address]     Phone:  [phone]     albuterol 108 (90 BASE) MCG/ACT Inhaler    fluticasone 110 MCG/ACT Inhaler                Primary Care Provider Office Phone # Fax #    SIGRID Ya [phone]       [address]  [place] [address]        Equal Access to Services     MICAELA CALL : Bernice Johnson, daniel ochoa, germán kaalmada domitila, jazmyne sloan. So [facility] [phone].    ATENCIÓN: Si habla español, tiene a dietz disposición servicios gratuitos de asistencia  lingüísticaLiz Juárez al [phone].    We comply with applicable federal civil rights laws and [place] laws. We do not discriminate on the basis of race, color, national origin, age, disability sex, sexual orientation or gender identity.            Thank you!     Thank you for choosing [facility]  for your care. Our goal is always to provide you with excellent care. Hearing back from our patients is one way we can continue to improve our services. Please take a few minutes to complete the written survey that you may receive in the mail after your visit with us. Thank you!             Your Updated Medication List - Protect others around you: Learn how to safely use, store and throw away your medicines at www.disposemymeds.org.          This list is accurate as of: 8/21/17  3:12 PM.  Always use your most recent med list.                   Brand Name Dispense Instructions for use Diagnosis    albuterol 108 (90 BASE) MCG/ACT Inhaler    albuterol    1 Inhaler    Inhale 1-2 puffs into the lungs every 6 hours as needed for shortness of breath / dyspnea    Intermittent asthma, uncomplicated       fluticasone 110 MCG/ACT Inhaler    FLOVENT HFA    1 Inhaler    Inhale 2 puffs into the lungs 2 times daily    Intermittent asthma, uncomplicated

## 2017-08-21 NOTE — LETTER
August 31, 2017    Laura Wilkes  [address] [address]    Dear Laura,  We are happy to inform you that your PAP smear result from 08/21/17 is normal.  We are now able to do a follow up test on PAP smears. The DNA test is for HPV (Human Papilloma Virus). Cervical cancer is closely linked with certain types of HPV. Your result showed no evidence of high risk HPV.  Therefore we recommend you return in 3 years for your next pap smear.  You will still need to return to the clinic every year for an annual exam and other preventive tests.  Please contact the clinic at [phone] with any questions.  Sincerely,    SIGRID Ya CNP/[facility]

## 2017-08-21 NOTE — LETTER
August 31, 2017      Laura Ceecheri  [address] [address]    Dear ,      I am happy to inform you that your recent cervical cancer screening test (PAP smear) was normal.      Preventative screenings such as this help to ensure your health for years to come. You should repeat a pap smear in 3 years, unless otherwise directed.      You will still need to return to the clinic every year for your annual exam and other preventive tests.     Please contact the clinic at [phone] if you have further questions.       Sincerely,      SIGRID Ya CNP/esh

## 2017-08-21 NOTE — PROGRESS NOTES
SUBJECTIVE:   CC: Laura Wileks is an 26 year old woman who presents for preventive health visit.     Answers for HPI/ROS submitted by the patient on 8/21/2017   Annual Exam:  Getting at least 3 servings of Calcium per day:: Yes  Bi-annual eye exam:: NO  Dental care twice a year:: Yes  Sleep apnea or symptoms of sleep apnea:: None  Diet:: Regular (no restrictions)  Frequency of exercise:: 6-7 days/week  Taking medications regularly:: Yes  Medication side effects:: None  Additional concerns today:: YES  PHQ-2 Score: 1  Duration of exercise:: 45-60 minutes    Dog bite yesterday.    Started on fluconazole 5/22/17 for tinea corpis, seems to be helping with rash.      History of exercise induced asthma.  Using albuterol prior to exercise, it is effective.  Has been out of inhaler and experiencing chest tightness and wheezing with exertion.  No symptoms outside of exercise.      Got bit by a dog yesterday while riding her bike.  Dog out of strangers yard while biking.  Reported to police, they will be checking in on dog in 10 days.  Owner reported dog's vaccines are up to date but pt was not provided paperwork.      Interested in IUD, thinks she would be interested in mirena v. Cristine.  cristine seems better.  Has been on OCP in the past, developed mood changes/depression/labile mood and breast swelling.  She is sexually active, using condoms.  LMP 8/14/17.  Menses are regular.      Interested in therapy for anxiety.  School is starting up and everything gets crazy.    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      Today's PHQ-2 Score:   PHQ-2 ( 1999 Pfizer) 8/21/2017 1/31/2017   Q1: Little interest or pleasure in doing things 0 0   Q2: Feeling down, depressed or hopeless 1 0   PHQ-2 Score 1 0   Q1: Little interest or pleasure in doing things Not at all -   Q2: Feeling down, depressed or hopeless Several days -   PHQ-2 Score 1 -         Abuse: Current or Past(Physical, Sexual or Emotional)- No  Do you  feel safe in your environment - Yes  Social History   Substance Use Topics     Smoking status: Never Smoker     Smokeless tobacco: Never Used     Alcohol use No      Comment: 3-4 drinks week     The patient does not drink >3 drinks per day nor >7 drinks per week.    Reviewed orders with patient.  Reviewed health maintenance and updated orders accordingly - Yes      Mammogram not appropriate for this patient based on age.    Pertinent mammograms are reviewed under the imaging tab.  History of abnormal Pap smear:   Last 3 Pap Results:   PAP (no units)   Date Value   03/29/2013 NIL       Reviewed and updated as needed this visit by clinical staff  Tobacco  Allergies  Meds  Med Hx  Surg Hx  Fam Hx  Soc Hx        Reviewed and updated as needed this visit by Provider        Past Medical History:   Diagnosis Date     Asthma exacerbation, mild 1995     Family history of melanoma       Past Surgical History:   Procedure Laterality Date     APPENDECTOMY N/A      LAPAROSCOPIC APPENDECTOMY N/A 3/27/2017    Procedure: LAPAROSCOPIC APPENDECTOMY;  Surgeon: Cathi Nunez MD;  Location: [facility] OR       ROS:  C: NEGATIVE for fever, chills, change in weight  I: as above   E: NEGATIVE for vision changes or irritation  ENT: NEGATIVE for ear, mouth and throat problems  RESP:as above  B: NEGATIVE for masses, tenderness or discharge  CV: NEGATIVE for chest pain, palpitations or peripheral edema  GI: NEGATIVE for nausea, abdominal pain, heartburn, or change in bowel habits   female: as above   M: NEGATIVE for significant arthralgias or myalgia  N: NEGATIVE for weakness, dizziness or paresthesias  PSYCHIATRIC: as above     OBJECTIVE:   /66 (BP Location: Left arm, Patient Position: Chair, Cuff Size: Adult Regular)  Pulse 79  Temp 97.9  F (36.6  C) (Tympanic)  Resp 16  Wt 135 lb (61.2 kg)  SpO2 99%  BMI 21.79 kg/m2  EXAM:  GENERAL: healthy, alert and no distress  EYES: Eyes grossly normal to inspection, PERRL and  conjunctivae and sclerae normal  HENT: ear canals and TM's normal, nose and mouth without ulcers or lesions  NECK: no adenopathy, no asymmetry, masses, or scars and thyroid normal to palpation  RESP: lungs clear to auscultation - no rales, rhonchi or wheezes  BREAST: normal without masses, tenderness or nipple discharge and no palpable axillary masses or adenopathy  CV: regular rate and rhythm, normal S1 S2, no S3 or S4, no murmur, click or rub, no peripheral edema and peripheral pulses strong  ABDOMEN: soft, nontender, no hepatosplenomegaly, no masses and bowel sounds normal   (female): normal female external genitalia, normal urethral meatus, vaginal mucosa pink, moist, well rugated, and normal cervix/adnexa/uterus without masses or discharge  MS: no gross musculoskeletal defects noted, no edema  SKIN: several abrasions to left lower leg with mild bruising present.  Wounds are scabbed and healing, no deep penetrative wounds.  No induration or edema concerning for infection  NEURO: Normal strength and tone, mentation intact and speech normal  PSYCH: mentation appears normal, affect normal/bright    ASSESSMENT/PLAN:   (Z01.419) Encounter for gynecological examination (general) (routine) without abnormal findings  (primary encounter diagnosis)  Plan: Pap imaged thin layer screen reflex to HPV if         ASCUS - recommend age 25 - 29        routine    (J45.20) Intermittent asthma, uncomplicated  Comment: does have dyspnea on exertion with extreme exertion/cycling  Plan: albuterol (ALBUTEROL) 108 (90 BASE) MCG/ACT         Inhaler, fluticasone (FLOVENT HFA) 110 MCG/ACT         Inhaler        Try steroid inhaler to see if helps performance, reviewed need for daily dosing and oral rinse    (Z30.09) Birth control counseling  Comment: interested in IUD  Plan: OB/GYN REFERRAL        Reviewed options, pt would be good candidate, recommend follow up with GYN    (F41.9) Anxiety  Comment: interested in counseling, triggered by  "school year  Plan: MENTAL HEALTH REFERRAL        Referral for counseling, follow up to address in more detail if ongoing symptoms    (S81.852A,  W54.0XXA) Dog bite of lower leg, left, initial encounter  Comment: bite from unknown dog, dog is being observed by animal control, unknown vaccine status of animal  Plan: low risk bit for rabies (on extremities) and since animal is being observed ok to defer rabies prophylaxis.  If animal gets sick in 1 days pt will call clinic for vaccine guidance.  No evidence of infection, since no deep puncture wound and bite is to low risk area will not initiate abx at this time.  Follow up if increased redness, swelling, pain or drainage         COUNSELING:   Reviewed preventive health counseling, as reflected in patient instructions       Regular exercise       Healthy diet/nutrition       reports that she has never smoked. She has never used smokeless tobacco.    Estimated body mass index is 21.79 kg/(m^2) as calculated from the following:    Height as of 3/28/17: 5' 6\" (1.676 m).    Weight as of this encounter: 135 lb (61.2 kg).         Counseling Resources:  ATP IV Guidelines  Pooled Cohorts Equation Calculator  Breast Cancer Risk Calculator  FRAX Risk Assessment  ICSI Preventive Guideline  Dietary Guidelines for Americans, 2010  USDA's MyPlate  ASA Prophylaxis  Lung CA Screening    [facility]  "

## 2017-08-21 NOTE — NURSING NOTE
"Chief Complaint   Patient presents with     Physical       Initial /66 (BP Location: Left arm, Patient Position: Chair, Cuff Size: Adult Regular)  Pulse 79  Temp 97.9  F (36.6  C) (Tympanic)  Resp 16  Wt 135 lb (61.2 kg)  SpO2 99%  BMI 21.79 kg/m2 Estimated body mass index is 21.79 kg/(m^2) as calculated from the following:    Height as of 3/28/17: 5' 6\" (1.676 m).    Weight as of this encounter: 135 lb (61.2 kg).  Medication Reconciliation: complete     Nohelia Young, CMA      "

## 2017-08-21 NOTE — PATIENT INSTRUCTIONS
1.  No rabies prophylaxis needed since animal can be observed.  If animal becomes sick in 10 days you need vaccine, call clinic.  tetanus is up to date.  Low risk for infection but follow up if increased selling, redness, pain, drainage   2.  Try flovent inhaler twice a day for asthma symptoms.  Use it every day, rinse mouth after  3.  See GYN for IUD  4.  Referral for therapist, check with counselor.  Follow up if worsening symptoms/you want to discuss medication  5.  Pap updated    Preventive Health Recommendations  Female Ages 26 - 39  Yearly exam:   See your health care provider every year in order to    Review health changes.     Discuss preventive care.      Review your medicines if you your doctor has prescribed any.    Until age 30: Get a Pap test every three years (more often if you have had an abnormal result).    After age 30: Talk to your doctor about whether you should have a Pap test every 3 years or have a Pap test with HPV screening every 5 years.   You do not need a Pap test if your uterus was removed (hysterectomy) and you have not had cancer.  You should be tested each year for STDs (sexually transmitted diseases), if you're at risk.   Talk to your provider about how often to have your cholesterol checked.  If you are at risk for diabetes, you should have a diabetes test (fasting glucose).  Shots: Get a flu shot each year. Get a tetanus shot every 10 years.   Nutrition:     Eat at least 5 servings of fruits and vegetables each day.    Eat whole-grain bread, whole-wheat pasta and brown rice instead of white grains and rice.    Talk to your provider about Calcium and Vitamin D.     Lifestyle    Exercise at least 150 minutes a week (30 minutes a day, 5 days of the week). This will help you control your weight and prevent disease.    Limit alcohol to one drink per day.    No smoking.     Wear sunscreen to prevent skin cancer.    See your dentist every six months for an exam and cleaning.

## 2017-08-21 NOTE — LETTER
My Asthma Action Plan  Name: Laura Wilkes   YOB: 1990  Date: 8/21/2017   My doctor: SIGRID Ya CNP   My clinic: [facility]        My Control Medicine: none  My Rescue Medicine: Albuterol (Proair/Ventolin/Proventil) inhaler 2 puffs as needed   My Asthma Severity: exercise induced  Avoid your asthma triggers: exercise or sports               GREEN ZONE   Good Control    I feel good    No cough or wheeze    Can work, sleep and play without asthma symptoms       Take your asthma control medicine every day.     1. If exercise triggers your asthma, take your rescue medication    15 minutes before exercise or sports, and    During exercise if you have asthma symptoms  2. Spacer to use with inhaler: If you have a spacer, make sure to use it with your inhaler             YELLOW ZONE Getting Worse  I have ANY of these:    I do not feel good    Cough or wheeze    Chest feels tight    Wake up at night   1. Keep taking your Green Zone medications  2. Start taking your rescue medicine:    every 20 minutes for up to 1 hour. Then every 4 hours for 24-48 hours.  3. If you stay in the Yellow Zone for more than 12-24 hours, contact your doctor.  4. If you do not return to the Green Zone in 12-24 hours or you get worse, start taking your oral steroid medicine if prescribed by your provider.           RED ZONE Medical Alert - Get Help  I have ANY of these:    I feel awful    Medicine is not helping    Breathing getting harder    Trouble walking or talking    Nose opens wide to breathe       1. Take your rescue medicine NOW  2. If your provider has prescribed an oral steroid medicine, start taking it NOW  3. Call your doctor NOW  4. If you are still in the Red Zone after 20 minutes and you have not reached your doctor:    Take your rescue medicine again and    Call 911 or go to the emergency room right away    See your regular doctor within 2 weeks of an Emergency Room or Urgent  Care visit for follow-up treatment.        Electronically signed by: Nohelia Young, August 21, 2017    Annual Reminders:  Meet with Asthma Educator,  Flu Shot in the Fall, consider Pneumonia Vaccination for patients with asthma (aged 19 and older).    Pharmacy:    [id number] [place], [place] - [address]  [facility] [address] [place], [place] - [address] AT [facility] & [facility]  [facility] [address] AT [address] [place] [address] & [facility]                    Asthma Triggers  How To Control Things That Make Your Asthma Worse    Triggers are things that make your asthma worse.  Look at the list below to help you find your triggers and what you can do about them.  You can help prevent asthma flare-ups by staying away from your triggers.      Trigger                                                          What you can do   Cigarette Smoke  Tobacco smoke can make asthma worse. Do not allow smoking in your home, car or around you.  Be sure no one smokes at a child s day care or school.  If you smoke, ask your health care provider for ways to help you quit.  Ask family members to quit too.  Ask your health care provider for a referral to Quit Plan to help you quit smoking, or call 9-495-283-PLAN.     Colds, Flu, Bronchitis  These are common triggers of asthma. Wash your hands often.  Don t touch your eyes, nose or mouth.  Get a flu shot every year.     Dust Mites  These are tiny bugs that live in cloth or carpet. They are too small to see. Wash sheets and blankets in hot water every week.   Encase pillows and mattress in dust mite proof covers.  Avoid having carpet if you can. If you have carpet, vacuum weekly.   Use a dust mask and HEPA vacuum.   Pollen and Outdoor Mold  Some people are allergic to trees, grass, or weed pollen, or molds. Try to keep your windows closed.  Limit time out doors when pollen count is high.   Ask you health care provider about  taking medicine during allergy season.     Animal Dander  Some people are allergic to skin flakes, urine or saliva from pets with fur or feathers. Keep pets with fur or feathers out of your home.    If you can t keep the pet outdoors, then keep the pet out of your bedroom.  Keep the bedroom door closed.  Keep pets off cloth furniture and away from stuffed toys.     Mice, Rats, and Cockroaches  Some people are allergic to the waste from these pests.   Cover food and garbage.  Clean up spills and food crumbs.  Store grease in the refrigerator.   Keep food out of the bedroom.   Indoor Mold  This can be a trigger if your home has high moisture. Fix leaking faucets, pipes, or other sources of water.   Clean moldy surfaces.  Dehumidify basement if it is damp and smelly.   Smoke, Strong Odors, and Sprays  These can reduce air quality. Stay away from strong odors and sprays, such as perfume, powder, hair spray, paints, smoke incense, paint, cleaning products, candles and new carpet.   Exercise or Sports  Some people with asthma have this trigger. Be active!  Ask your doctor about taking medicine before sports or exercise to prevent symptoms.    Warm up for 5-10 minutes before and after sports or exercise.     Other Triggers of Asthma  Cold air:  Cover your nose and mouth with a scarf.  Sometimes laughing or crying can be a trigger.  Some medicines and food can trigger asthma.

## 2017-08-22 ASSESSMENT — ASTHMA QUESTIONNAIRES: ACT_TOTALSCORE: 18

## 2017-08-23 LAB
COPATH REPORT: NORMAL
PAP: NORMAL

## 2017-09-01 ENCOUNTER — OFFICE VISIT (OUTPATIENT)
Dept: MIDWIFE SERVICES | Facility: CLINIC | Age: 27
End: 2017-09-01
Payer: COMMERCIAL

## 2017-09-01 VITALS
SYSTOLIC BLOOD PRESSURE: 107 MMHG | WEIGHT: 134 LBS | HEART RATE: 66 BPM | DIASTOLIC BLOOD PRESSURE: 66 MMHG | BODY MASS INDEX: 21.63 KG/M2

## 2017-09-01 DIAGNOSIS — Z53.8 UNSUCCESSFUL ATTEMPT TO INSERT INTRAUTERINE DEVICE (IUD): ICD-10-CM

## 2017-09-01 DIAGNOSIS — Z30.430 ENCOUNTER FOR IUD INSERTION: Primary | ICD-10-CM

## 2017-09-01 LAB — BETA HCG QUAL IFA URINE: NEGATIVE

## 2017-09-01 PROCEDURE — 84703 CHORIONIC GONADOTROPIN ASSAY: CPT | Performed by: ADVANCED PRACTICE MIDWIFE

## 2017-09-01 PROCEDURE — 58300 INSERT INTRAUTERINE DEVICE: CPT | Mod: 52 | Performed by: ADVANCED PRACTICE MIDWIFE

## 2017-09-01 NOTE — MR AVS SNAPSHOT
"              After Visit Summary   2017    Laura Wilkes    MRN: [id number]           Patient Information     Date Of Birth          1990        Visit Information        Provider Department      2017 10:30 AM Jerri Callejas CNM [facility]        Today's Diagnoses     Encounter for IUD insertion    -  1    Unsuccessful attempt to insert intrauterine device (IUD)           Follow-ups after your visit        Follow-up notes from your care team     Return if symptoms worsen or fail to improve, for IUD placement .      Who to contact     If you have questions or need follow up information about today's clinic visit or your schedule please contact [facility] directly at [phone].  Normal or non-critical lab and imaging results will be communicated to you by [employer], letter or phone within 4 business days after the clinic has received the results. If you do not hear from us within 7 days, please contact the clinic through [employer] or phone. If you have a critical or abnormal lab result, we will notify you by phone as soon as possible.  Submit refill requests through [employer] or call your pharmacy and they will forward the refill request to us. Please allow 3 business days for your refill to be completed.          Additional Information About Your Visit        MyChart Information     [employer] lets you send messages to your doctor, view your test results, renew your prescriptions, schedule appointments and more. To sign up, go to [email or web address] . Click on \"Log in\" on the left side of the screen, which will take you to the Welcome page. Then click on \"Sign up Now\" on the right side of the page.     You will be asked to enter the access code listed below, as well as some personal information. Please follow the directions to create your username and password.     Your access code is: [email or web address]  Expires: 2017 11:13 AM     Your access code will  " in 90 days. If you need help or a new code, please call your [place] clinic or [phone].        Care EveryWhere ID     This is your Care EveryWhere ID. This could be used by other organizations to access your [place] medical records  [id number]        Your Vitals Were     Pulse Last Period BMI (Body Mass Index)             66 08/14/2017 21.63 kg/m2          Blood Pressure from Last 3 Encounters:   09/01/17 107/66   08/21/17 104/66   06/01/17 102/64    Weight from Last 3 Encounters:   09/01/17 134 lb (60.8 kg)   08/21/17 135 lb (61.2 kg)   06/01/17 134 lb (60.8 kg)              We Performed the Following     Beta HCG qual IFA urine        Primary Care Provider Office Phone # Fax #    Bailey Silvia Irving SIGRID [facility] [phone]       [address]  [facility] [address]        Equal Access to Services     MICAELA CALL : Hadii aad ku hadasho Soomaali, waaxda luqadaha, qaybta kaalmada adeegyada, waxay idiin hayedyn caridad irby . So [facility] [phone].    ATENCIÓN: Si habla esparaceli, tiene a dietz disposición servicios gratuitos de asistencia lingüística. Llame al [phone].    We comply with applicable federal civil rights laws and [place] laws. We do not discriminate on the basis of race, color, national origin, age, disability sex, sexual orientation or gender identity.            Thank you!     Thank you for choosing [facility]  for your care. Our goal is always to provide you with excellent care. Hearing back from our patients is one way we can continue to improve our services. Please take a few minutes to complete the written survey that you may receive in the mail after your visit with us. Thank you!             Your Updated Medication List - Protect others around you: Learn how to safely use, store and throw away your medicines at www.disposemymeds.org.          This list is accurate as of: 9/1/17 11:13 AM.  Always use your most recent med list.                    Brand Name Dispense Instructions for use Diagnosis    albuterol 108 (90 BASE) MCG/ACT Inhaler    PROAIR HFA    1 Inhaler    Inhale 1-2 puffs into the lungs every 6 hours as needed for shortness of breath / dyspnea    Intermittent asthma, uncomplicated       fluticasone 110 MCG/ACT Inhaler    FLOVENT HFA    1 Inhaler    Inhale 2 puffs into the lungs 2 times daily    Intermittent asthma, uncomplicated

## 2017-09-01 NOTE — NURSING NOTE
"Chief Complaint   Patient presents with     Consult       Initial /66  Pulse 66  Wt 134 lb (60.8 kg)  LMP 2017  BMI 21.63 kg/m2 Estimated body mass index is 21.63 kg/(m^2) as calculated from the following:    Height as of 3/28/17: 5' 6\" (1.676 m).    Weight as of this encounter: 134 lb (60.8 kg).  BP completed using cuff size: regular        The following HM Due: NONE      The following patient reported/Care Every where data was sent to:  P ABSTRACT QUALITY INITIATIVES [[id number]]       N/a      Maggie Branham MA               "

## 2017-09-01 NOTE — PROGRESS NOTES
Unsuccessful attempt to place IUD    INDICATIONS:                                                      Is a pregnancy test required: Yes.  Was it positive or negative?  Positive  Was a consent obtained?  Yes    Laura Wilkes is a 26 year old female,   who presents for insertion of an IUD. The risks, benefits and alternatives of IUD insertion were discussed in detail previously. She also has reviewed the product brochure.  She has elected to go ahead with the insertion  today and her questions were answered. Consent was signed. A pregnancy test was performed today:  Yes    PROCEDURE:                                                      The pelvic exam revealed normal external genitalia. On bimanual exam the uterus was Anteverted and normal in size with no tenderness present. A speculum was inserted into the vagina and the cervix was visualized. The cervix was prepped with Betadine . The anterior lip of the cervix was grasped with a single toothed tenaculum. Unable to pass the internal cervical os with sound.    POST PROCEDURE:                                                      She  tolerated the procedure well. There were no complications. Patient was discharged in stable condition.  Discussed option to return with period to place IUD or to see OB/Gyn MD who could place a cervical block.  She plans to have it placed this year because she met her insurance deductible.      Jerri Callejas CNM

## 2017-10-09 ENCOUNTER — OFFICE VISIT (OUTPATIENT)
Dept: OBGYN | Facility: CLINIC | Age: 27
End: 2017-10-09
Payer: COMMERCIAL

## 2017-10-09 VITALS — HEART RATE: 58 BPM | SYSTOLIC BLOOD PRESSURE: 115 MMHG | DIASTOLIC BLOOD PRESSURE: 75 MMHG | TEMPERATURE: 98 F

## 2017-10-09 DIAGNOSIS — Z30.430 ENCOUNTER FOR INSERTION OF INTRAUTERINE CONTRACEPTIVE DEVICE (IUD): Primary | ICD-10-CM

## 2017-10-09 PROBLEM — Z97.5 IUD CONTRACEPTION: Status: RESOLVED | Noted: 2017-10-09 | Resolved: 2017-10-09

## 2017-10-09 PROBLEM — Z97.5 IUD CONTRACEPTION: Status: ACTIVE | Noted: 2017-10-09

## 2017-10-09 LAB — BETA HCG QUAL IFA URINE: NEGATIVE

## 2017-10-09 PROCEDURE — 84703 CHORIONIC GONADOTROPIN ASSAY: CPT | Performed by: OBSTETRICS & GYNECOLOGY

## 2017-10-09 PROCEDURE — 58300 INSERT INTRAUTERINE DEVICE: CPT | Performed by: OBSTETRICS & GYNECOLOGY

## 2017-10-09 NOTE — NURSING NOTE
"No chief complaint on file.      Initial /75  Pulse 58  Temp 98  F (36.7  C) (Oral)  LMP 10/04/2017 Estimated body mass index is 21.63 kg/(m^2) as calculated from the following:    Height as of 3/28/17: 5' 6\" (1.676 m).    Weight as of 17: 134 lb (60.8 kg).  BP completed using cuff size: regular        The following HM Due: NONE      The following patient reported/Care Every where data was sent to:  P ABSTRACT QUALITY INITIATIVES [[id number]]  VICKIE Carlin            "

## 2017-10-09 NOTE — PROGRESS NOTES
GYN clinic visit  10/9/2017    CC: IUD insertion    HPI:   Laura Wilkes is a 26 year old  who presents to clinic today for an IUD insertion.  Placement was attempted on 17, but there were unable to pass the sound beyond the cervical os.  She presents today for IUD insertion with paracervical block.  Patient's last menstrual period was 10/04/2017..  Last Pap smear was 17.     Reviewed GYN hx, OB hx, past medical hx, surgical hx and family hx.   Reviewed medications and allergies. Changes made in EPIC.     OBJECTIVE:  Vitals:    10/09/17 1008   BP: 115/75   Pulse: 58   Temp: 98  F (36.7  C)   TempSrc: Oral     There is no height or weight on file to calculate BMI.    Gen: alert, oriented, no distress, cooperative and pleasant  Abd: soft, nontender, nondistended, normoactive bowel sounds, no masses, no scars  : normal external genitalia without lesions or abnormalities. Normal Bartholin's, normal Kohler's, normal urethra. Normal vaginal mucosa, no abnormal discharge or lesions. Cervix appears WNL, no lesions or erythema. Bimanual exam reveals 8 week sized anteverted mobile uterus, no cervical motion tenderness, no uterine tenderness, no adnexal masses.    PROCEDURE:  Patient has verbalized understanding of risks and benefits. She was counseled on risks of infection, bleeding, uterine perforation, cervical laceration, expulsion, overall risk of pregnancy 2 in 1000, if she does get pregnant that there is an increased risk of ectopic pregnancy. All questions answered. She has signed the consent form.    Urine pregnancy test was negative.      A regular Graves speculum was placed in the vagina with good visualization of the cervix.  The cervix was then swabbed with a betadine x3.  Tenaculum was placed at the 12 o'clock position on the cervix.  A total of 10cc lidocaine without epi were injected at the 4:00 and 8:00 positions of the cervico-vaginal junction.  An os finder was easily passed through the  cervix and the uterus sounded to 8cm.  The Mirena  IUD was then placed in the usual fashion under sterile technique without difficulty.  Strings were clipped about 2-3 cm from the cervical os.  Tenaculum was removed and cervix was hemostatic.  There were no complications. The patient tolerated the procedure well.      ASSESSMENT:   Laura Wilkes is a 26 year old  who had a Mirena IUD inserted today without complication.    PLAN:  1. Contraception  Mirena placed without difficulty.  NDC: 59115-203-97 LOT: [id number] EXP:    The patient should feel for the IUD strings in 2 weeks.  If unable to locate them, she should return to clinic for a speculum examination for confirmation that the IUD is in place. Bleeding pattern of this particular IUD was discussed with the patient. She is aware that the IUD will need to be removed in 5 years or PRN.  She is to return to clinic for her next annual or PRN.    Jeannette Tan MD

## 2017-10-09 NOTE — MR AVS SNAPSHOT
"              After Visit Summary   10/9/2017    Laura Wilkes    MRN: [id number]           Patient Information     Date Of Birth          1990        Visit Information        Provider Department      10/9/2017 10:00 AM Jeannette Tan MD [facility]        Today's Diagnoses     Encounter for insertion of intrauterine contraceptive device (IUD)    -  1       Follow-ups after your visit        Who to contact     If you have questions or need follow up information about today's clinic visit or your schedule please contact [facility] directly at [phone].  Normal or non-critical lab and imaging results will be communicated to you by [employer], letter or phone within 4 business days after the clinic has received the results. If you do not hear from us within 7 days, please contact the clinic through [employer] or phone. If you have a critical or abnormal lab result, we will notify you by phone as soon as possible.  Submit refill requests through [employer] or call your pharmacy and they will forward the refill request to us. Please allow 3 business days for your refill to be completed.          Additional Information About Your Visit        MyChart Information     [employer] lets you send messages to your doctor, view your test results, renew your prescriptions, schedule appointments and more. To sign up, go to [email or web address] . Click on \"Log in\" on the left side of the screen, which will take you to the Welcome page. Then click on \"Sign up Now\" on the right side of the page.     You will be asked to enter the access code listed below, as well as some personal information. Please follow the directions to create your username and password.     Your access code is: [email or web address]  Expires: 2017 11:13 AM     Your access code will  in 90 days. If you need help or a new code, please call your [facility] or [phone].        Care EveryWhere ID     This is " your Care EveryWhere ID. This could be used by other organizations to access your [place] medical records  [id number]        Your Vitals Were     Pulse Temperature Last Period             58 98  F (36.7  C) (Oral) 10/04/2017          Blood Pressure from Last 3 Encounters:   10/09/17 115/75   09/01/17 107/66   08/21/17 104/66    Weight from Last 3 Encounters:   09/01/17 134 lb (60.8 kg)   08/21/17 135 lb (61.2 kg)   06/01/17 134 lb (60.8 kg)              We Performed the Following     Beta HCG qual IFA urine - [facility]     Insertion of an IUD     Mirena          Today's Medication Changes          These changes are accurate as of: 10/9/17 10:54 AM.  If you have any questions, ask your nurse or doctor.               Start taking these medicines.        Dose/Directions    levonorgestrel 20 MCG/24HR IUD   Commonly known as:  MIRENA (52 MG)        Dose:  1 each   1 each (20 mcg) by Intrauterine route once for 1 dose   Quantity:  1 each   Refills:  0            Where to get your medicines      Some of these will need a paper prescription and others can be bought over the counter.  Ask your nurse if you have questions.     You don't need a prescription for these medications     levonorgestrel 20 MCG/24HR IUD                Primary Care Provider Office Phone # Fax #    Bailey Silvia Villatoro, APRN [facility] [phone]       [address]  [facility] [address]        Equal Access to Services     MICAELA CALL AH: Hadii jaguar castroo Sokike, waaxda luqadaha, qaybta kaalmada domitila, waxmoriah reynaldo still [facility] irby . So [facility] [phone].    ATENCIÓN: Si habla español, tiene a dietz disposición servicios gratuitos de asistencia lingüística. Llame al [phone].    We comply with applicable federal civil rights laws and [place] laws. We do not discriminate on the basis of race, color, national origin, age, disability, sex, sexual orientation, or gender identity.            Thank you!      Thank you for choosing [facility]  for your care. Our goal is always to provide you with excellent care. Hearing back from our patients is one way we can continue to improve our services. Please take a few minutes to complete the written survey that you may receive in the mail after your visit with us. Thank you!             Your Updated Medication List - Protect others around you: Learn how to safely use, store and throw away your medicines at www.disposemymeds.org.          This list is accurate as of: 10/9/17 10:54 AM.  Always use your most recent med list.                   Brand Name Dispense Instructions for use Diagnosis    albuterol 108 (90 BASE) MCG/ACT Inhaler    PROAIR HFA    1 Inhaler    Inhale 1-2 puffs into the lungs every 6 hours as needed for shortness of breath / dyspnea    Intermittent asthma, uncomplicated       fluticasone 110 MCG/ACT Inhaler    FLOVENT HFA    1 Inhaler    Inhale 2 puffs into the lungs 2 times daily    Intermittent asthma, uncomplicated       levonorgestrel 20 MCG/24HR IUD    MIRENA (52 MG)    1 each    1 each (20 mcg) by Intrauterine route once for 1 dose

## 2017-10-28 DIAGNOSIS — J45.20 INTERMITTENT ASTHMA, UNCOMPLICATED: ICD-10-CM

## 2017-10-31 NOTE — TELEPHONE ENCOUNTER
FLOVENT  [facility] ORAL INH 120INH        Last Written Prescription Date:  8/21/17  Last Fill Quantity: 1,   # refills: 1  Last Office visit:   8/21/17    Routing refill request to provider for review/approval because:  Drug not on the [facility], P or [facility] refill protocol or controlled substance

## 2017-11-01 RX ORDER — DEXAMETHASONE 4 MG/1
TABLET ORAL
Qty: 12 G | Refills: 1 | Status: SHIPPED | OUTPATIENT
Start: 2017-11-01 | End: 2024-01-29

## 2017-11-01 NOTE — TELEPHONE ENCOUNTER
Date of Last Asthma Action Plan Letter:   Asthma Action Plan Q1 Year    Topic Date Due     Asthma Action Plan - yearly  08/21/2018      Asthma Control Test:   ACT Total Scores 8/21/2017   ACT TOTAL SCORE -   ASTHMA ER VISITS -   ASTHMA HOSPITALIZATIONS -   ACT TOTAL SCORE (Goal Greater than or Equal to 20) 18   In the past 12 months, how many times did you visit the emergency room for your asthma without being admitted to the hospital? 0   In the past 12 months, how many times were you hospitalized overnight because of your asthma? 0       Date of Last Spirometry Test:   No results found for this or any previous visit.      Prescription approved per [facility] Refill Protocol.  EDWARD LorenzoN, RN  [facility]

## 2017-11-27 ENCOUNTER — OFFICE VISIT (OUTPATIENT)
Dept: FAMILY MEDICINE | Facility: CLINIC | Age: 27
End: 2017-11-27
Payer: COMMERCIAL

## 2017-11-27 DIAGNOSIS — L30.9 ECZEMA, UNSPECIFIED TYPE: Primary | ICD-10-CM

## 2017-11-27 DIAGNOSIS — L30.9 DERMATITIS: ICD-10-CM

## 2017-11-27 LAB
KOH PREP SPEC: NORMAL
SPECIMEN SOURCE: NORMAL

## 2017-11-27 PROCEDURE — 87220 TISSUE EXAM FOR FUNGI: CPT | Performed by: FAMILY MEDICINE

## 2017-11-27 PROCEDURE — 99214 OFFICE O/P EST MOD 30 MIN: CPT | Performed by: FAMILY MEDICINE

## 2017-11-27 RX ORDER — TRIAMCINOLONE ACETONIDE 1 MG/G
CREAM TOPICAL
Qty: 80 G | Refills: 0 | Status: SHIPPED | OUTPATIENT
Start: 2017-11-27 | End: 2024-01-29

## 2017-11-27 NOTE — LETTER
11/27/2017         RE: Laura Wilkes  [address] [facility] [address]        Dear Colleague,    Thank you for referring your patient, Laura Wilkes, to the [facility] - PRIMARY CARE SKIN. Please see a copy of my visit note below.    [facility] - PRIMARY CARE SKIN    CC : Rash   SUBJECTIVE:                                                    Laura Wilkes is a 27 year old female who presents to clinic today for follow-up of an itchy rash on the inside of left leg and on right arm beginning again 1 month ago. Rash first appeared on the left leg.    Therapies tried : She has had courses of oral terbinafine and fluconazole during last episode in April 2017.  Response to treatment : Symptoms had fully cleared after last episode  Side effects noted : None     Previous treatments : oral terbinafine 250 mg QD begun 4/4/17 for 2 weeks, topical ciclopirox 0.77% cream begun in Jan 2017, topical econazole 1% cream began 2 months ago.  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    Products used : Andalou lavender organic moisturizer but not used daily. Dr. Bloom's Castile soap. Dryer sheets also used.    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    Patient Active Problem List   Diagnosis     Family history of melanoma     Asthma, exercise induced     Left knee pain     Acute appendicitis       Past Medical History:   Diagnosis Date     Asthma exacerbation, mild 1995     Family history of melanoma     Past Surgical History:   Procedure Laterality Date     APPENDECTOMY N/A      LAPAROSCOPIC APPENDECTOMY N/A 3/27/2017    Procedure: LAPAROSCOPIC APPENDECTOMY;  Surgeon: Cathi Nunez MD;   Location:  OR      Social History   Substance Use Topics     Smoking status: Never Smoker     Smokeless tobacco: Never Used     Alcohol use No      Comment: 3-4 drinks week    Family History     Problem (# of Occurrences) Relation (Name,Age of Onset)    Melanoma (1) Mother (52)           Prescription Medications as of 11/27/2017             FLOVENT  MCG/ACT Inhaler INHALE 2 PUFFS BY MOUTH TWICE DAILY    albuterol (ALBUTEROL) 108 (90 BASE) MCG/ACT Inhaler Inhale 1-2 puffs into the lungs every 6 hours as needed for shortness of breath / dyspnea    levonorgestrel (MIRENA, 52 MG,) 20 MCG/24HR IUD 1 each (20 mcg) by Intrauterine route once for 1 dose          No Known Allergies     INTEGUMENTARY/SKIN: POSITIVE for pruritis and rash  ROS : 14 point review of systems was negative except the symptoms listed above in the HPI.    This document serves as a record of the services and decisions personally performed and made by Nakita Weldon MD. It was created on her behalf by Lico Delatorre, a trained medical scribe.  The creation of this document is based on the scribe's personal observations and the provider's statements to the medical scribe.  Lico Delatorre, November 27, 2017 7:23 AM      OBJECTIVE:                                                    GENERAL: healthy, alert and no distress  SKIN: Wiley Skin Type - II.  Arms and Legs were examined. The dermatoscope was used to help evaluate pigmented lesions.  Skin Pertinent Findings:  Right lateral upper arm : 15 mm in size, scaly, erythematous patch with slight central clearing.    Right forearm : Patch of maculopapular eruption.    Left post and right posterior lower legs : 1 cm in size, scaly, slightly erythematous, circular patches.    Diagnostic results:  Results for orders placed or performed in visit on 11/27/17 (from the past 24 hour(s))   KOH skin hair or nails   Result Value Ref Range    Specimen Description Skin     KOH Skin Hair Nails Test No fungal  "elements seen            ASSESSMENT:                                                      Encounter Diagnosis   Name Primary?     Dermatitis Yes         PLAN:                                                    Patient Instructions   FUTURE APPOINTMENTS    Follow up as needed if rash symptoms are not improving.    Follow up with either [facility] or other dermatologist for annual full-body skin cancer screenings due to your family history of melanoma.    DRY SKIN INSTRUCTIONS  Routine use of moisturizer is important for healthy, resilient skin.    Twice daily use of a moisturizer such as over-the-counter (OTC) CeraVe moisturizer cream (in the jar). CeraVe products contain ceramides and filaggrin proteins that can help to maintain the body's moisture layer.    Always apply moisturizer after washing, within 3 minutes of drying off for best effect.    Do not overuse soap. Just apply soap on the groin and armpits, unless you have been sweating extensively. Recommended products include OTC unscented Dove sensitive skin or OTC Vanicream cleansing bar.    Good facial cleansers include OTC CeraVe hydrating facial cleanser or OTC Cetaphil daily facial cleanser.    Avoid use of scented products and/or antistatic dryer sheets.    Always try to wear rubber gloves when washing dishes or cleaning.    TOPICAL STEROID INSTRUCTIONS  Triamcinolone 0.1% cream.    Apply an amount equal to half of a fingertip (0.25 g) to the affected area(s) on the arms and legs, two times per day for 10-14 days.    \"Fingertip Amount\"      Not to be used on face or groin.    After initial treatment, topical steroid may be used as needed for flare-ups.    Topical steroid use is for short-term treatment only. If after initial treatment, you are using this for prolonged periods of time, return to clinic for re-evaluation of treatment.    Keep in mind to also regularly use moisturizer, as this preventative measure can help maintain " your skin's natural moisture barrier.        PROCEDURES:                                                    None.    TT : 20 minutes.  CT : 15 minutes.      The information in this document, created by the medical scribe for me, accurately reflects the services I personally performed and the decisions made by me. I have reviewed and approved this document for accuracy prior to leaving the patient care area.  Nakita Weldon MD November 27, 2017 7:22 AM  [facility] [place] - PRIMARY CARE SKIN    Again, thank you for allowing me to participate in the care of your patient.        Sincerely,        Cindi Weldon MD

## 2017-11-27 NOTE — MR AVS SNAPSHOT
"              After Visit Summary   11/27/2017    Laura Wilkes    MRN: [id number]           Patient Information     Date Of Birth          1990        Visit Information        Provider Department      11/27/2017 7:20 AM Cindi Weldon MD [facility] - Primary Care Skin        Today's Diagnoses     Dermatitis    -  1    Eczema, unspecified type          Care Instructions    FUTURE APPOINTMENTS    Follow up as needed if rash symptoms are not improving.    Follow up with either [facility] or other dermatologist for annual full-body skin cancer screenings due to your family history of melanoma.    DRY SKIN INSTRUCTIONS  Routine use of moisturizer is important for healthy, resilient skin.    Twice daily use of a moisturizer such as over-the-counter (OTC) CeraVe moisturizer cream (in the jar). CeraVe products contain ceramides and filaggrin proteins that can help to maintain the body's moisture layer.    Always apply moisturizer after washing, within 3 minutes of drying off for best effect.    Do not overuse soap. Just apply soap on the groin and armpits, unless you have been sweating extensively. Recommended products include OTC unscented Dove sensitive skin or OTC Vanicream cleansing bar.    Good facial cleansers include OTC CeraVe hydrating facial cleanser or OTC Cetaphil daily facial cleanser.    Avoid use of scented products and/or antistatic dryer sheets.    Always try to wear rubber gloves when washing dishes or cleaning.    TOPICAL STEROID INSTRUCTIONS  Triamcinolone 0.1% cream.    Apply an amount equal to half of a fingertip (0.25 g) to the affected area(s) on the arms and legs, two times per day for 10-14 days.    \"Fingertip Amount\"      Not to be used on face or groin.    After initial treatment, topical steroid may be used as needed for flare-ups.    Topical steroid use is for short-term treatment only. If after initial treatment, you are using this for " "prolonged periods of time, return to clinic for re-evaluation of treatment.    Keep in mind to also regularly use moisturizer, as this preventative measure can help maintain your skin's natural moisture barrier.          Follow-ups after your visit        Who to contact     If you have questions or need follow up information about today's clinic visit or your schedule please contact [facility] - PRIMARY CARE SKIN directly at [phone].  Normal or non-critical lab and imaging results will be communicated to you by [facility], letter or phone within 4 business days after the clinic has received the results. If you do not hear from us within 7 days, please contact the clinic through [facility] or phone. If you have a critical or abnormal lab result, we will notify you by phone as soon as possible.  Submit refill requests through [facility] or call your pharmacy and they will forward the refill request to us. Please allow 3 business days for your refill to be completed.          Additional Information About Your Visit        [employer] Information     [facility] lets you send messages to your doctor, view your test results, renew your prescriptions, schedule appointments and more. To sign up, go to [email or web address] . Click on \"Log in\" on the left side of the screen, which will take you to the Welcome page. Then click on \"Sign up Now\" on the right side of the page.     You will be asked to enter the access code listed below, as well as some personal information. Please follow the directions to create your username and password.     Your access code is: [email or web address]  Expires: 2017 10:13 AM     Your access code will  in 90 days. If you need help or a new code, please call your [facility] clinic or [phone].        Care EveryWhere ID     This is your Care EveryWhere ID. This could be used by other organizations to access your [facility] medical records  [id number]         Blood Pressure from Last 3 " Encounters:   10/09/17 115/75   09/01/17 107/66   08/21/17 104/66    Weight from Last 3 Encounters:   09/01/17 134 lb (60.8 kg)   08/21/17 135 lb (61.2 kg)   06/01/17 134 lb (60.8 kg)              We Performed the Following     KOH skin hair or nails          Today's Medication Changes          These changes are accurate as of: 11/27/17  7:46 AM.  If you have any questions, ask your nurse or doctor.               Start taking these medicines.        Dose/Directions    triamcinolone 0.1 % cream   Commonly known as:  KENALOG   Used for:  Eczema, unspecified type   Started by:  Cindi Weldon MD        Apply to affected areas on legs, arms or trunk bid-tid for 10-14 consecutive days, not to be used on face or groin   Quantity:  80 g   Refills:  0            Where to get your medicines      These medications were sent to [facility] [address] [place] [address] & [facility]  [address] [place] [address]     Phone:  [phone]     triamcinolone 0.1 % cream                Primary Care Provider Office Phone # Fax #    Bailey Silvia Villatoro, SIGRID [facility] [phone] [address]  [facility] [address]        Equal Access to Services     MICAELA CALL : Hadii jaguar hammer hadasho Soomaali, waaxda luqadaha, qaybta kaalmada adeegyada, jazmyne sloan. So [facility] [phone].    ATENCIÓN: Si habla español, tiene a dietz disposición servicios gratuitos de asistencia lingüística. Marquita al [phone].    We comply with applicable federal civil rights laws and [place] laws. We do not discriminate on the basis of race, color, national origin, age, disability, sex, sexual orientation, or gender identity.            Thank you!     Thank you for choosing [facility] - PRIMARY CARE SKIN  for your care. Our goal is always to provide you with excellent care. Hearing back from our patients is one way we can continue to improve our services.  Please take a few minutes to complete the written survey that you may receive in the mail after your visit with us. Thank you!             Your Updated Medication List - Protect others around you: Learn how to safely use, store and throw away your medicines at www.disposemymeds.org.          This list is accurate as of: 11/27/17  7:46 AM.  Always use your most recent med list.                   Brand Name Dispense Instructions for use Diagnosis    albuterol 108 (90 BASE) MCG/ACT Inhaler    PROAIR HFA    1 Inhaler    Inhale 1-2 puffs into the lungs every 6 hours as needed for shortness of breath / dyspnea    Intermittent asthma, uncomplicated       FLOVENT  MCG/ACT Inhaler   Generic drug:  fluticasone     12 g    INHALE 2 PUFFS BY MOUTH TWICE DAILY    Intermittent asthma, uncomplicated       levonorgestrel 20 MCG/24HR IUD    MIRENA (52 MG)    1 each    1 each (20 mcg) by Intrauterine route once for 1 dose        triamcinolone 0.1 % cream    KENALOG    80 g    Apply to affected areas on legs, arms or trunk bid-tid for 10-14 consecutive days, not to be used on face or groin    Eczema, unspecified type

## 2017-11-27 NOTE — PROGRESS NOTES
[facility] - PRIMARY CARE SKIN    CC : Rash   SUBJECTIVE:                                                    Laura Wilkes is a 27 year old female who presents to clinic today for follow-up of an itchy rash on the inside of left leg and on right arm beginning again 1 month ago. Rash first appeared on the left leg.    Therapies tried : She has had courses of oral terbinafine and fluconazole during last episode in April 2017.  Response to treatment : Symptoms had fully cleared after last episode  Side effects noted : None     Previous treatments : oral terbinafine 250 mg QD begun 4/4/17 for 2 weeks, topical ciclopirox 0.77% cream begun in Jan 2017, topical econazole 1% cream began 2 months ago.  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    Products used : Andalou lavender organic moisturizer but not used daily. Dr. Bloom's Castile soap. Dryer sheets also used.    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    Patient Active Problem List   Diagnosis     Family history of melanoma     Asthma, exercise induced     Left knee pain     Acute appendicitis       Past Medical History:   Diagnosis Date     Asthma exacerbation, mild 1995     Family history of melanoma     Past Surgical History:   Procedure Laterality Date     APPENDECTOMY N/A      LAPAROSCOPIC APPENDECTOMY N/A 3/27/2017    Procedure: LAPAROSCOPIC APPENDECTOMY;  Surgeon: Cathi Nunez MD;  Location:  OR      Social History   Substance Use Topics     Smoking status: Never Smoker     Smokeless tobacco: Never Used     Alcohol use No      Comment: 3-4 drinks week    Family History     Problem (# of Occurrences) Relation (Name,Age of Onset)    Melanoma  (1) Mother (52)           Prescription Medications as of 11/27/2017             FLOVENT  MCG/ACT Inhaler INHALE 2 PUFFS BY MOUTH TWICE DAILY    albuterol (ALBUTEROL) 108 (90 BASE) MCG/ACT Inhaler Inhale 1-2 puffs into the lungs every 6 hours as needed for shortness of breath / dyspnea    levonorgestrel (MIRENA, 52 MG,) 20 MCG/24HR IUD 1 each (20 mcg) by Intrauterine route once for 1 dose          No Known Allergies     INTEGUMENTARY/SKIN: POSITIVE for pruritis and rash  ROS : 14 point review of systems was negative except the symptoms listed above in the HPI.    This document serves as a record of the services and decisions personally performed and made by Nakita Weldon MD. It was created on her behalf by Lico Delatorre, a trained medical scribe.  The creation of this document is based on the scribe's personal observations and the provider's statements to the medical scribe.  Lico Delatorre, November 27, 2017 7:23 AM      OBJECTIVE:                                                    GENERAL: healthy, alert and no distress  SKIN: Wiley Skin Type - II.  Arms and Legs were examined. The dermatoscope was used to help evaluate pigmented lesions.  Skin Pertinent Findings:  Right lateral upper arm : 15 mm in size, scaly, erythematous patch with slight central clearing.    Right forearm : Patch of maculopapular eruption.    Left post and right posterior lower legs : 1 cm in size, scaly, slightly erythematous, circular patches.    Diagnostic results:  Results for orders placed or performed in visit on 11/27/17 (from the past 24 hour(s))   KOH skin hair or nails   Result Value Ref Range    Specimen Description Skin     KOH Skin Hair Nails Test No fungal elements seen            ASSESSMENT:                                                      Encounter Diagnosis   Name Primary?     Dermatitis Yes         PLAN:                                                    Patient Instructions   FUTURE APPOINTMENTS    Follow up as  "needed if rash symptoms are not improving.    Follow up with either [facility] or other dermatologist for annual full-body skin cancer screenings due to your family history of melanoma.    DRY SKIN INSTRUCTIONS  Routine use of moisturizer is important for healthy, resilient skin.    Twice daily use of a moisturizer such as over-the-counter (OTC) CeraVe moisturizer cream (in the jar). CeraVe products contain ceramides and filaggrin proteins that can help to maintain the body's moisture layer.    Always apply moisturizer after washing, within 3 minutes of drying off for best effect.    Do not overuse soap. Just apply soap on the groin and armpits, unless you have been sweating extensively. Recommended products include OTC unscented Dove sensitive skin or OTC Vanicream cleansing bar.    Good facial cleansers include OTC CeraVe hydrating facial cleanser or OTC Cetaphil daily facial cleanser.    Avoid use of scented products and/or antistatic dryer sheets.    Always try to wear rubber gloves when washing dishes or cleaning.    TOPICAL STEROID INSTRUCTIONS  Triamcinolone 0.1% cream.    Apply an amount equal to half of a fingertip (0.25 g) to the affected area(s) on the arms and legs, two times per day for 10-14 days.    \"Fingertip Amount\"      Not to be used on face or groin.    After initial treatment, topical steroid may be used as needed for flare-ups.    Topical steroid use is for short-term treatment only. If after initial treatment, you are using this for prolonged periods of time, return to clinic for re-evaluation of treatment.    Keep in mind to also regularly use moisturizer, as this preventative measure can help maintain your skin's natural moisture barrier.        PROCEDURES:                                                    None.    TT : 20 minutes.  CT : 15 minutes.      The information in this document, created by the medical scribe for me, accurately reflects the services I " personally performed and the decisions made by me. I have reviewed and approved this document for accuracy prior to leaving the patient care area.  Nakita Weldon MD November 27, 2017 7:22 AM  [facility] - PRIMARY CARE SKIN

## 2017-11-27 NOTE — PATIENT INSTRUCTIONS
"FUTURE APPOINTMENTS    Follow up as needed if rash symptoms are not improving.    Follow up with either [facility] or other dermatologist for annual full-body skin cancer screenings due to your family history of melanoma.    DRY SKIN INSTRUCTIONS  Routine use of moisturizer is important for healthy, resilient skin.    Twice daily use of a moisturizer such as over-the-counter (OTC) CeraVe moisturizer cream (in the jar). CeraVe products contain ceramides and filaggrin proteins that can help to maintain the body's moisture layer.    Always apply moisturizer after washing, within 3 minutes of drying off for best effect.    Do not overuse soap. Just apply soap on the groin and armpits, unless you have been sweating extensively. Recommended products include OTC unscented Dove sensitive skin or OTC Vanicream cleansing bar.    Good facial cleansers include OTC CeraVe hydrating facial cleanser or OTC Cetaphil daily facial cleanser.    Avoid use of scented products and/or antistatic dryer sheets.    Always try to wear rubber gloves when washing dishes or cleaning.    TOPICAL STEROID INSTRUCTIONS  Triamcinolone 0.1% cream.    Apply an amount equal to half of a fingertip (0.25 g) to the affected area(s) on the arms and legs, two times per day for 10-14 days.    \"Fingertip Amount\"      Not to be used on face or groin.    After initial treatment, topical steroid may be used as needed for flare-ups.    Topical steroid use is for short-term treatment only. If after initial treatment, you are using this for prolonged periods of time, return to clinic for re-evaluation of treatment.    Keep in mind to also regularly use moisturizer, as this preventative measure can help maintain your skin's natural moisture barrier.  "

## 2017-12-20 ENCOUNTER — OFFICE VISIT (OUTPATIENT)
Dept: FAMILY MEDICINE | Facility: CLINIC | Age: 27
End: 2017-12-20
Payer: COMMERCIAL

## 2017-12-20 DIAGNOSIS — Z12.83 SKIN CANCER SCREENING: Primary | ICD-10-CM

## 2017-12-20 DIAGNOSIS — Z80.8 FAMILY HISTORY OF MELANOMA: ICD-10-CM

## 2017-12-20 DIAGNOSIS — B07.0 PLANTAR WARTS: ICD-10-CM

## 2017-12-20 DIAGNOSIS — D22.9 MULTIPLE BENIGN MELANOCYTIC NEVI: ICD-10-CM

## 2017-12-20 PROCEDURE — 99213 OFFICE O/P EST LOW 20 MIN: CPT | Performed by: FAMILY MEDICINE

## 2017-12-20 NOTE — LETTER
12/20/2017         RE: Laura Wilkes  [id number] [id number]        Dear Colleague,    Thank you for referring your patient, Laura Wilkes, to the [facility] - PRIMARY CARE SKIN. Please see a copy of my visit note below.    [facility] PR[facility]    CC : skin cancer screening (full-body)  SUBJECTIVE:                                                    Laura Wilkes is a 27 year old female who presents to clinic today for a full-body skin exam because of her family history of melanoma. No bothersome lesions noticed by the patient or other skin concerns.    Issue Two : She has tried OTC treatments for warts on the heel.    Personal history of skin cancer : NO.  Family history of skin cancer : YES - melanoma in mother at age 47.    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    Refer to electronic medical record (EMR) for past medical history and medications.    INTEGUMENTARY/SKIN: NEGATIVE for worrisome rashes, moles or lesions  ROS : 14 point review of systems was negative except the symptoms listed above in the HPI.    This document serves as a record of the services and decisions personally performed and made by Nakita Weldon MD. It was created on her behalf by Lico Delatorre, a trained medical scribe.  The creation of this document is based on the scribe's personal observations and the provider's statements to the medical scribe.  Lico Delatorre, December 20, 2017 8:34 AM      OBJECTIVE:                                                    GENERAL: healthy, alert and no distress  SKIN: [place] Skin Type - II.  This patient was examined from the top of the head to the bottom of the feet  including scalp, face, neck, back, chest, breasts, buttocks, both arms, both legs, both hands, both feet, all 10 fingers and all 10 toes. The dermatoscope was used to help evaluate pigmented lesions.  Skin Pertinent Findings:  Right arm(s) and left arm(s) :  "Infrequent 1 mm - 2 mm in size, brown macules most consistent with benign (melanocytic nevi).    Back : Infrequent 3 mm - 4 mm in size, brown macules most consistent with benign (melanocytic nevi).    Left medial heel : 3 mm in size verrucous lesion(s).    Diagnostic Test Results:  none     .      ASSESSMENT:                                                      Encounter Diagnoses   Name Primary?     Skin cancer screening Yes     Family history of melanoma      Plantar warts      Multiple benign melanocytic nevi          PLAN:                                                    Patient Instructions   FUTURE APPOINTMENTS  Follow up in 1 year(s) for a full-body skin cancer screening.    SUN PROTECTION INSTRUCTIONS  Sun damage can lead to skin cancer and premature aging of the skin.      The best way to protect from sun damage to your skin is to avoid the sun during peak hours (10 am - 2 pm) even on overcast days.      Use UPF sun-protective clothing, which while more expensive initially provides longer lasting coverage without having to worry about remembering to re-apply.  1. Wear a wide-brimmed hat and sunglasses.   2. Wear sun-protective clothing.  [employer] and other [facility] make sun protective clothing that are stylish, comfortable and cool. [facility] [facility] [employer] and other [facility] [employer] [facility] make UV arm sleeves suitable for golfing, gardening and other activities.      Sunscreen instructions:  1. Use sunscreens with Zinc Oxide, Titanium Dioxide or Avobenzone to protect from UVA rays.  2. Use SPF 30-50+ to protect from UVB rays.  3. Re-apply every 2 hours even if water resistant.  4. Apply on your face every day even when cloudy and even in the winter. UVA \"aging rays\" penetrate window glass and are just as strong in the winter as in the summer.    Product Recommendations:    Good examples include: Blue Lizard, EltaMD, Solbar    Good daily moisturizers with SPF: Vanicream, CeraVe.    For sensitive skin, consider : " "SkinMedica Essential Defense Mineral Shield Broad Spectrum SPF 35      Never use tanning beds. Tanning beds are associated with much higher risks of skin cancer.    All tanning damages the skin. Aim for ivory skin year round and you will have less trouble with your skin in years to come. There is no merit in getting \"a base tan\" before a warm weather vacation, as any tanning indicates your body's response to sun damage.    Stop smoking. Smokers have higher rates of skin cancer and also have premature skin wrinkling.    FYI  You should use about 3 tablespoons of sunscreen to protect your whole body. Thus a typical eight ounce bottle of sunscreen should last 4 applications. Remember, that the SPF rating is compromised if you don t apply enough. Most people only apply 1/2 - 1/3 of the amount they need. Also don t forget areas such as your ears, feet, upper back and harder to reach places. Keep in mind that these amounts should be increased for larger body sizes.    Sunscreens with titanium dioxide and/or zinc oxide in the active ingredients are physical blockers as opposed to chemical blockers. Chemical-free sunscreens should not irritate the skin.    Spray-on sunscreens may be used for touch-up application only, not as a base layer. Also, use with caution around small children due to inhalation risk.    Avoid retinyl palmitate products.    Avoid combination products that include both sunscreen and insect repellant, as sunscreen should be applied every 2 hours, but insect repellant should not be applied as frequently.    SPF means sun protection factor, which is just the degree to which the sunscreen can protect against UVB rays. There is no rating system for UVA rays. SPF is calculated as the time skin will burn when sunscreen is applied vs. skin without sunscreen.    Water resistant sunscreens should be re-applied every 1-2 hours.    For more " information:  http://www.skincancer.org/prevention/sun-protection/sunscreen/sunscreens-safe-and-effective    SKIN CANCER SELF-EXAM INSTRUCTIONS  Check every month in the mirror or with a household member. Be aware of any changes, especially bleeding or tenderness. Also, make sure to check your nails for color changes after removal of nail polish.    For melanoma, check for:  A - Asymmetry. One half unlike the other half.  B - Border. Irregular, scalloped, ragged, notched, blurred or poorly defined borders.  C - Color. Color variations from one area to another, with shades of tan, brown and/or black present. Sometimes white, red or blue.  D - Diameter. Greater than 6 mm (about the size of a pencil eraser). Any new growth of a mole should be concerning and be evaluated.  E - Evolving. A mole or skin lesion that looks different from the rest or is changing in size, shape or color.    For basal cell carcinoma and squamous cell carcinoma, check for:    Sores, shiny bumps, nodules, scaly lesions, or wart-like growths that are itchy, tender, crusting, scabbing, eroding, oozing or bleeding.    Open sores/wounds or reddish/irritated areas that do not heal within 2-3 weeks.    Scar-like areas that are white, yellow or waxy in color.    OTC WART INSTRUCTIONS - Compound W, WartStick or Wart Away    Wash the affected area (and optionally, soak in warm water for 5 minutes). Dry thoroughly.    Then, apply the OTC treatment to the warts every night for 2-3 weeks.    After application, cover with duct tape or a bandage, leaving on through the night. Remove in the morning.    Avoid getting treatment onto normal skin surrounding the wart.    Additionally, a pumice stone or clean washcloth can be used to gently remove the softened layers of the wart in between applications. Do not use this stone or cloth anywhere else, as it can spread the wart virus.    The patient was counseled about sunscreens and sun avoidance. The patient was  counseled to check the skin regularly and report any lesion that is new, changing, itching, scabbing, bleeding or otherwise bothersome. The patient was discharged ambulatory and in stable condition.    Educational brochures given to patient : skin cancer.       PROCEDURES:                                                    None.    TT : 25 minutes.  CT : 15 minutes.      The information in this document, created by the medical scribe for me, accurately reflects the services I personally performed and the decisions made by me. I have reviewed and approved this document for accuracy prior to leaving the patient care area.  Nakita Weldon MD December 20, 2017 8:34 AM  [facility] - PRIMARY CARE SKIN    Again, thank you for allowing me to participate in the care of your patient.        Sincerely,        Cindi Weldon MD

## 2017-12-20 NOTE — PATIENT INSTRUCTIONS
"FUTURE APPOINTMENTS  Follow up in 1 year(s) for a full-body skin cancer screening.    SUN PROTECTION INSTRUCTIONS  Sun damage can lead to skin cancer and premature aging of the skin.      The best way to protect from sun damage to your skin is to avoid the sun during peak hours (10 am - 2 pm) even on overcast days.      Use UPF sun-protective clothing, which while more expensive initially provides longer lasting coverage without having to worry about remembering to re-apply.  1. Wear a wide-brimmed hat and sunglasses.   2. Wear sun-protective clothing.  [employer] and other [employer] make sun protective clothing that are stylish, comfortable and cool. [employer] and other [employer] make UV arm sleeves suitable for golfing, gardening and other activities.      Sunscreen instructions:  1. Use sunscreens with Zinc Oxide, Titanium Dioxide or Avobenzone to protect from UVA rays.  2. Use SPF 30-50+ to protect from UVB rays.  3. Re-apply every 2 hours even if water resistant.  4. Apply on your face every day even when cloudy and even in the winter. UVA \"aging rays\" penetrate window glass and are just as strong in the winter as in the summer.    Product Recommendations:    Good examples include: Blue Lizard, EltaMD, Solbar    Good daily moisturizers with SPF: Vanicream, CeraVe.    For sensitive skin, consider : SkinMedica Essential Defense Mineral Shield Broad Spectrum SPF 35      Never use tanning beds. Tanning beds are associated with much higher risks of skin cancer.    All tanning damages the skin. Aim for ivory skin year round and you will have less trouble with your skin in years to come. There is no merit in getting \"a base tan\" before a warm weather vacation, as any tanning indicates your body's response to sun damage.    Stop smoking. Smokers have higher rates of skin cancer and also have premature skin wrinkling.    FYI  You should use about 3 tablespoons of sunscreen to protect your whole body. Thus a " typical eight ounce bottle of sunscreen should last 4 applications. Remember, that the SPF rating is compromised if you don t apply enough. Most people only apply 1/2 - 1/3 of the amount they need. Also don t forget areas such as your ears, feet, upper back and harder to reach places. Keep in mind that these amounts should be increased for larger body sizes.    Sunscreens with titanium dioxide and/or zinc oxide in the active ingredients are physical blockers as opposed to chemical blockers. Chemical-free sunscreens should not irritate the skin.    Spray-on sunscreens may be used for touch-up application only, not as a base layer. Also, use with caution around small children due to inhalation risk.    Avoid retinyl palmitate products.    Avoid combination products that include both sunscreen and insect repellant, as sunscreen should be applied every 2 hours, but insect repellant should not be applied as frequently.    SPF means sun protection factor, which is just the degree to which the sunscreen can protect against UVB rays. There is no rating system for UVA rays. SPF is calculated as the time skin will burn when sunscreen is applied vs. skin without sunscreen.    Water resistant sunscreens should be re-applied every 1-2 hours.    For more information:  http://www.skincancer.org/prevention/sun-protection/sunscreen/sunscreens-safe-and-effective    SKIN CANCER SELF-EXAM INSTRUCTIONS  Check every month in the mirror or with a household member. Be aware of any changes, especially bleeding or tenderness. Also, make sure to check your nails for color changes after removal of nail polish.    For melanoma, check for:  A - Asymmetry. One half unlike the other half.  B - Border. Irregular, scalloped, ragged, notched, blurred or poorly defined borders.  C - Color. Color variations from one area to another, with shades of tan, brown and/or black present. Sometimes white, red or blue.  D - Diameter. Greater than 6 mm (about the  size of a pencil eraser). Any new growth of a mole should be concerning and be evaluated.  E - Evolving. A mole or skin lesion that looks different from the rest or is changing in size, shape or color.    For basal cell carcinoma and squamous cell carcinoma, check for:    Sores, shiny bumps, nodules, scaly lesions, or wart-like growths that are itchy, tender, crusting, scabbing, eroding, oozing or bleeding.    Open sores/wounds or reddish/irritated areas that do not heal within 2-3 weeks.    Scar-like areas that are white, yellow or waxy in color.    OTC WART INSTRUCTIONS - Compound W, WartStick or Wart Away    Wash the affected area (and optionally, soak in warm water for 5 minutes). Dry thoroughly.    Then, apply the OTC treatment to the warts every night for 2-3 weeks.    After application, cover with duct tape or a bandage, leaving on through the night. Remove in the morning.    Avoid getting treatment onto normal skin surrounding the wart.    Additionally, a pumice stone or clean washcloth can be used to gently remove the softened layers of the wart in between applications. Do not use this stone or cloth anywhere else, as it can spread the wart virus.

## 2017-12-20 NOTE — MR AVS SNAPSHOT
"              After Visit Summary   12/20/2017    Laura Wilkes    MRN: [id number]           Patient Information     Date Of Birth          1990        Visit Information        Provider Department      12/20/2017 9:40 AM Cindi Weldon MD [facility] - [facility] Skin        Today's Diagnoses     Skin cancer screening    -  1    Family history of melanoma        Plantar warts        Multiple benign melanocytic nevi          Care Instructions    FUTURE APPOINTMENTS  Follow up in 1 year(s) for a full-body skin cancer screening.    SUN PROTECTION INSTRUCTIONS  Sun damage can lead to skin cancer and premature aging of the skin.      The best way to protect from sun damage to your skin is to avoid the sun during peak hours (10 am - 2 pm) even on overcast days.      Use UPF sun-protective clothing, which while more expensive initially provides longer lasting coverage without having to worry about remembering to re-apply.  1. Wear a wide-brimmed hat and sunglasses.   2. Wear sun-protective clothing.  SwimTopia and other [employer] make sun protective clothing that are stylish, comfortable and cool. AutoMedx and other [employer] make UV arm sleeves suitable for golfing, gardening and other activities.      Sunscreen instructions:  1. Use sunscreens with Zinc Oxide, Titanium Dioxide or Avobenzone to protect from UVA rays.  2. Use SPF 30-50+ to protect from UVB rays.  3. Re-apply every 2 hours even if water resistant.  4. Apply on your face every day even when cloudy and even in the winter. UVA \"aging rays\" penetrate window glass and are just as strong in the winter as in the summer.    Product Recommendations:    Good examples include: Perry Callejas, EltaMD, Solbar    Good daily moisturizers with SPF: Vanicream, CeraVe.    For sensitive skin, consider : SkinMedica Essential Defense Mineral Shield Broad Spectrum SPF 35      Never use tanning beds. Tanning beds are associated with much " "higher risks of skin cancer.    All tanning damages the skin. Aim for ivory skin year round and you will have less trouble with your skin in years to come. There is no merit in getting \"a base tan\" before a warm weather vacation, as any tanning indicates your body's response to sun damage.    Stop smoking. Smokers have higher rates of skin cancer and also have premature skin wrinkling.    FYI  You should use about 3 tablespoons of sunscreen to protect your whole body. Thus a typical eight ounce bottle of sunscreen should last 4 applications. Remember, that the SPF rating is compromised if you don t apply enough. Most people only apply 1/2 - 1/3 of the amount they need. Also don t forget areas such as your ears, feet, upper back and harder to reach places. Keep in mind that these amounts should be increased for larger body sizes.    Sunscreens with titanium dioxide and/or zinc oxide in the active ingredients are physical blockers as opposed to chemical blockers. Chemical-free sunscreens should not irritate the skin.    Spray-on sunscreens may be used for touch-up application only, not as a base layer. Also, use with caution around small children due to inhalation risk.    Avoid retinyl palmitate products.    Avoid combination products that include both sunscreen and insect repellant, as sunscreen should be applied every 2 hours, but insect repellant should not be applied as frequently.    SPF means sun protection factor, which is just the degree to which the sunscreen can protect against UVB rays. There is no rating system for UVA rays. SPF is calculated as the time skin will burn when sunscreen is applied vs. skin without sunscreen.    Water resistant sunscreens should be re-applied every 1-2 hours.    For more information:  http://www.skincancer.org/prevention/sun-protection/sunscreen/sunscreens-safe-and-effective    SKIN CANCER SELF-EXAM INSTRUCTIONS  Check every month in the mirror or with a household member. Be " aware of any changes, especially bleeding or tenderness. Also, make sure to check your nails for color changes after removal of nail polish.    For melanoma, check for:  A - Asymmetry. One half unlike the other half.  B - Border. Irregular, scalloped, ragged, notched, blurred or poorly defined borders.  C - Color. Color variations from one area to another, with shades of tan, brown and/or black present. Sometimes white, red or blue.  D - Diameter. Greater than 6 mm (about the size of a pencil eraser). Any new growth of a mole should be concerning and be evaluated.  E - Evolving. A mole or skin lesion that looks different from the rest or is changing in size, shape or color.    For basal cell carcinoma and squamous cell carcinoma, check for:    Sores, shiny bumps, nodules, scaly lesions, or wart-like growths that are itchy, tender, crusting, scabbing, eroding, oozing or bleeding.    Open sores/wounds or reddish/irritated areas that do not heal within 2-3 weeks.    Scar-like areas that are white, yellow or waxy in color.    OTC WART INSTRUCTIONS - Compound W, WartStick or Wart Away    Wash the affected area (and optionally, soak in warm water for 5 minutes). Dry thoroughly.    Then, apply the OTC treatment to the warts every night for 2-3 weeks.    After application, cover with duct tape or a bandage, leaving on through the night. Remove in the morning.    Avoid getting treatment onto normal skin surrounding the wart.    Additionally, a pumice stone or clean washcloth can be used to gently remove the softened layers of the wart in between applications. Do not use this stone or cloth anywhere else, as it can spread the wart virus.          Follow-ups after your visit        Your next 10 appointments already scheduled     Dec 20, 2017  9:40 AM CST   Office Visit with Cindi Weldon MD   [facility] ([facility] )    [address] [place] [address]  "[place] [address]   [phone]           Bring a current list of meds and any records pertaining to this visit. For Physicals, please bring immunization records and any forms needing to be filled out. Please arrive 10 minutes early to complete paperwork.              Who to contact     If you have questions or need follow up information about today's clinic visit or your schedule please contact [facility] - PRIMARY C[facility] SK[facility] directly at [phone].  Normal or non-critical lab and imaging results will be communicated to you by MyChart, letter or phone within 4 business days after the clinic has received the results. If you do not hear from us within 7 days, please contact the clinic through [facility] or phone. If you have a critical or abnormal lab result, we will notify you by phone as soon as possible.  Submit refill requests through [email or web address] or call your pharmacy and they will forward the refill request to us. Please allow 3 business days for your refill to be completed.          Additional Information About Your Visit        [facility] Information     [email or web address] lets you send messages to your doctor, view your test results, renew your prescriptions, schedule appointments and more. To sign up, go to [email or web address] . Click on \"Log in\" on the left side of the screen, which will take you to the Welcome page. Then click on \"Sign up Now\" on the right side of the page.     You will be asked to enter the access code listed below, as well as some personal information. Please follow the directions to create your username and password.     Your access code is: [email or web address]  Expires: 3/20/2018  8:44 AM     Your access code will  in 90 days. If you need help or a new code, please call your [facility] clinic or [phone].        Care EveryWhere ID     This is your Care EveryWhere ID. This could be used by other organizations to access your [facility] medical records  [id number]         Blood Pressure from " Last 3 Encounters:   10/09/17 115/75   09/01/17 107/66   08/21/17 104/66    Weight from Last 3 Encounters:   09/01/17 134 lb (60.8 kg)   08/21/17 135 lb (61.2 kg)   06/01/17 134 lb (60.8 kg)              Today, you had the following     No orders found for display       Primary Care Provider Office Phone # Fax #    [facility] [phone]       [address]  [facility] [address]        Equal Access to Services     [facility]: Hadii aad ku hadasho Soomaali, waaxda luqadaha, qaybta kaalmada adeegyada, jazmyne irby . So [facility] [phone].    ATENCIÓN: Si habla español, tiene a dietz disposición servicios gratuitos de asistencia lingüística. [facility] [phone].    We comply with applicable federal civil rights laws and [place] laws. We do not discriminate on the basis of race, color, national origin, age, disability, sex, sexual orientation, or gender identity.            Thank you!     Thank you for choosing [facility] - PRIMARY CARE [facility]  for your care. Our goal is always to provide you with excellent care. Hearing back from our patients is one way we can continue to improve our services. Please take a few minutes to complete the written survey that you may receive in the mail after your visit with us. Thank you!             Your Updated Medication List - Protect others around you: Learn how to safely use, store and throw away your medicines at www.disposemymeds.org.          This list is accurate as of: 12/20/17  8:44 AM.  Always use your most recent med list.                   Brand Name Dispense Instructions for use Diagnosis    albuterol 108 (90 BASE) MCG/ACT Inhaler    PROAIR HFA    1 Inhaler    Inhale 1-2 puffs into the lungs every 6 hours as needed for shortness of breath / dyspnea    Intermittent asthma, uncomplicated       FLOVENT  MCG/ACT Inhaler   Generic drug:  fluticasone     12 g    INHALE 2 PUFFS BY MOUTH TWICE DAILY     Intermittent asthma, uncomplicated       levonorgestrel 20 MCG/24HR IUD    MIRENA (52 MG)    1 each    1 each (20 mcg) by Intrauterine route once for 1 dose        triamcinolone 0.1 % cream    KENALOG    80 g    Apply to affected areas on legs, arms or trunk bid-tid for 10-14 consecutive days, not to be used on face or groin    Eczema, unspecified type

## 2018-07-20 ENCOUNTER — NURSE TRIAGE (OUTPATIENT)
Dept: NURSING | Facility: CLINIC | Age: 28
End: 2018-07-20

## 2018-07-20 NOTE — TELEPHONE ENCOUNTER
"    Reason for Disposition    [1] Large node AND [2] present > 2 weeks    Additional Information    Negative: Severe difficulty breathing (e.g., struggling for each breath, speaks in single words)    Negative: Known hernia is main concern (i.e., soft lump or swelling in the groin that goes away when you push on it)    Negative: Swelling of scrotum is main symptom    Negative: Swelling of face is main symptom    Negative: [1] Swollen node is in the neck AND [2] < 1 inch (2.5 cm) in size AND [3] sore throat is main symptom    Negative: [1] Node is in the neck AND [2] causes difficulty breathing    Negative: [1] Node is in the neck AND [2] can't swallow fluids    Negative: Fever > 103 F (39.4 C)    Negative: [1] Lump or swelling in groin AND [2] pulsating (like heartbeat)    Negative: Patient sounds very sick or weak to the triager    Negative: [1] Single large node AND [2] size > 1 inch (2.5 cm) AND [3] fever    Negative: [1] Overlying skin is red AND [2] fever    Negative: [1] Single large node AND [2] size > 1 inch (2.5 cm) AND [3] no fever    Negative: Rapid increase in size of node over several hours    Negative: [1] Overlying skin is red AND [2] no fever    Negative: [1] Tender node in the groin AND [2] has a sore, scratch, cut or painful red area on that leg    Negative: [1] Tender node in the armpit AND [2] has a sore, scratch, cut or painful red area on that arm    Negative: [1] Tender node in the neck AND [2] also has a sore throat AND [3] minimal/no runny nose or cough    Negative: Fever present > 3 days (72 hours)    Negative: Large nodes at multiple locations    Negative: [1] Very tender to the touch AND [2] no fever    Answer Assessment - Initial Assessment Questions  1. LOCATION: \"Where is the swollen node located?\" \"Is the matching node on the other side of the body also swollen?\"       Right groin, pea sized  2. SIZE: \"How big is the node?\" (Inches or centimeters) (or compare to common objects such as " "pea, bean, marble, golf ball)       pea  3. ONSET: \"When did the swelling start?\"       2 weeks  4. NECK NODES: \"Is there a sore throat, runny nose or other symptoms of a cold?\"       no  5. GROIN OR ARMPIT NODES: \"Is there a sore, scratch, cut or painful red area on that arm or leg?\"       no  6. FEVER: \"Do you have a fever?\" If so, ask: \"What is it, how was it measured, and when did it start?\"       no  7. CAUSE: \"What do you think is causing the swollen lymph nodes?\"      Not sure, is an avid bicycle rider  8. OTHER SYMPTOMS: \"Do you have any other symptoms?\"      No other symptoms  9. PREGNANCY: \"Is there any chance you are pregnant?\" \"When was your last menstrual period?\"      Does not think see    Protocols used: LYMPH NODES SWOLLEN-ADULT-AH      "

## 2018-07-23 ENCOUNTER — OFFICE VISIT (OUTPATIENT)
Dept: FAMILY MEDICINE | Facility: CLINIC | Age: 28
End: 2018-07-23
Payer: COMMERCIAL

## 2018-07-23 VITALS
TEMPERATURE: 98.4 F | WEIGHT: 138 LBS | DIASTOLIC BLOOD PRESSURE: 62 MMHG | BODY MASS INDEX: 22.27 KG/M2 | SYSTOLIC BLOOD PRESSURE: 100 MMHG | HEART RATE: 43 BPM | RESPIRATION RATE: 12 BRPM | OXYGEN SATURATION: 100 %

## 2018-07-23 DIAGNOSIS — R19.09 GROIN SWELLING: Primary | ICD-10-CM

## 2018-07-23 PROCEDURE — 99213 OFFICE O/P EST LOW 20 MIN: CPT | Performed by: FAMILY MEDICINE

## 2018-07-23 NOTE — PROGRESS NOTES
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   She naun and dismounts her bike a lot and it has gotten painful.  She has noticed a right lump in the groin area. Pain is present when she sits on it or area gets aggravated.   No vaginal discharge, dysuria or drainage.  She tried to pop it as she thought it was in ingrown hair. She wasn't successful.    Problem list and histories reviewed & adjusted, as indicated.  Additional history: as documented    Labs reviewed in EPIC    Reviewed and updated as needed this visit by clinical staff  Tobacco  Allergies  Meds  Med Hx  Surg Hx  Fam Hx  Soc Hx      Reviewed and updated as needed this visit by Provider         ROS:  Constitutional, HEENT, cardiovascular, pulmonary, gi and gu systems are negative, except as otherwise noted.    OBJECTIVE:     /62 (BP Location: Right arm, Patient Position: Chair, Cuff Size: Adult Regular)  Pulse (!) 43  Temp 98.4  F (36.9  C) (Oral)  Resp 12  Wt 138 lb (62.6 kg)  SpO2 100%  BMI 22.27 kg/m2  Body mass index is 22.27 kg/(m^2).  GENERAL: healthy, alert and no distress  EYES: Eyes grossly normal to inspection  HENT: nose and mouth without ulcers or lesions  MS: no gross musculoskeletal defects noted, no edema  SKIN: right inner groin with small lump (? Lymph node)      Diagnostic Test Results:  none     ASSESSMENT/PLAN:       1. Groin swelling  - ordered for further evaluation, tx as indicated   - US Abdomen Limited; Future      Deqa Sriram Elias MD  [facility]

## 2018-07-23 NOTE — MR AVS SNAPSHOT
After Visit Summary   7/23/2018    Laura Wilkes    MRN: [id number]           Patient Information     Date Of Birth          1990        Visit Information        Provider Department      7/23/2018 3:40 PM Willis Elias MD [facility]        Today's Diagnoses     Groin swelling    -  1       Follow-ups after your visit        Who to contact     If you have questions or need follow up information about today's clinic visit or your schedule please contact [facility] directly at [phone].  Normal or non-critical lab and imaging results will be communicated to you by MyChart, letter or phone within 4 business days after the clinic has received the results. If you do not hear from us within 7 days, please contact the clinic through MyChart or phone. If you have a critical or abnormal lab result, we will notify you by phone as soon as possible.  Submit refill requests through [employer] or call your pharmacy and they will forward the refill request to us. Please allow 3 business days for your refill to be completed.          Additional Information About Your Visit        Care EveryWhere ID     This is your Care EveryWhere ID. This could be used by other organizations to access your [facility] medical records  [id number]        Your Vitals Were     Pulse Temperature Respirations Pulse Oximetry BMI (Body Mass Index)       43 98.4  F (36.9  C) (Oral) 12 100% 22.27 kg/m2        Blood Pressure from Last 3 Encounters:   07/23/18 100/62   10/09/17 115/75   09/01/17 107/66    Weight from Last 3 Encounters:   07/23/18 138 lb (62.6 kg)   09/01/17 134 lb (60.8 kg)   08/21/17 135 lb (61.2 kg)               Primary Care Provider Office Phone # Fax #    Bailey SIGRID Kaba [facility] [phone] [address] [place] [address]        Equal Access to Services     MICAELA CALL AH: Bernice Johnson, daniel ochoa, germán conway  jazmyne rameshheather donnellyaan ah. [facility] [phone].    ATENCIÓN: Si vanessala elena, tiene a dietz disposición servicios gratuitos de asistencia lingüística. Marquita al [phone].    We comply with applicable federal civil rights laws and [place] laws. We do not discriminate on the basis of race, color, national origin, age, disability, sex, sexual orientation, or gender identity.            Thank you!     Thank you for choosing [facility]  for your care. Our goal is always to provide you with excellent care. Hearing back from our patients is one way we can continue to improve our services. Please take a few minutes to complete the written survey that you may receive in the mail after your visit with us. Thank you!             Your Updated Medication List - Protect others around you: Learn how to safely use, store and throw away your medicines at www.disposemymeds.org.          This list is accurate as of 7/23/18 11:59 PM.  Always use your most recent med list.                   Brand Name Dispense Instructions for use Diagnosis    albuterol 108 (90 Base) MCG/ACT Inhaler    PROAIR HFA    1 Inhaler    Inhale 1-2 puffs into the lungs every 6 hours as needed for shortness of breath / dyspnea    Intermittent asthma, uncomplicated       FLOVENT  MCG/ACT Inhaler   Generic drug:  fluticasone     12 g    INHALE 2 PUFFS BY MOUTH TWICE DAILY    Intermittent asthma, uncomplicated       levonorgestrel 20 MCG/24HR IUD    MIRENA (52 MG)    1 each    1 each (20 mcg) by Intrauterine route once for 1 dose        triamcinolone 0.1 % cream    KENALOG    80 g    Apply to affected areas on legs, arms or trunk bid-tid for 10-14 consecutive days, not to be used on face or groin    Eczema, unspecified type

## 2018-07-26 ENCOUNTER — TELEPHONE (OUTPATIENT)
Dept: FAMILY MEDICINE | Facility: CLINIC | Age: 28
End: 2018-07-26

## 2018-07-26 NOTE — TELEPHONE ENCOUNTER
RN, can you please inform pt that I have ordered ultrasound. Can she please call [phone] to schedule ultrasound.   KIARRA

## 2018-08-06 NOTE — TELEPHONE ENCOUNTER
Pt states that the swelling hasn't been bothering her lately. She thinks it was exacerbated by her bike riding which she hasn't been doing much lately.    Should she still get the US?    (Patient has given permission to leave detailed message on voice mail if no answer on phone.)      Brea Bae RN, BSN

## 2019-01-24 ENCOUNTER — OFFICE VISIT (OUTPATIENT)
Dept: FAMILY MEDICINE | Facility: CLINIC | Age: 29
End: 2019-01-24
Payer: COMMERCIAL

## 2019-01-24 VITALS — DIASTOLIC BLOOD PRESSURE: 64 MMHG | OXYGEN SATURATION: 99 % | SYSTOLIC BLOOD PRESSURE: 104 MMHG | HEART RATE: 63 BPM

## 2019-01-24 DIAGNOSIS — D22.9 MULTIPLE BENIGN MELANOCYTIC NEVI: ICD-10-CM

## 2019-01-24 DIAGNOSIS — Z12.83 SKIN CANCER SCREENING: Primary | ICD-10-CM

## 2019-01-24 DIAGNOSIS — Z80.8 FAMILY HISTORY OF MALIGNANT MELANOMA OF SKIN: ICD-10-CM

## 2019-01-24 DIAGNOSIS — D18.01 CAPILLARY HEMANGIOMA OF SKIN: ICD-10-CM

## 2019-01-24 PROCEDURE — 99213 OFFICE O/P EST LOW 20 MIN: CPT | Mod: 25 | Performed by: FAMILY MEDICINE

## 2019-01-24 PROCEDURE — 17999 UNLISTD PX SKN MUC MEMB SUBQ: CPT | Performed by: FAMILY MEDICINE

## 2019-01-24 NOTE — PATIENT INSTRUCTIONS
"FUTURE APPOINTMENTS  Follow up in 1 year(s) for a full-body skin cancer screening.    Apply Vaseline to the cautery site behind the right ear until the area heals.    SUN PROTECTION INSTRUCTIONS  Sun damage can lead to skin cancer and premature aging of the skin.      The best way to protect from sun damage to your skin is to avoid the sun during peak hours (10 am - 2 pm) even on overcast days.    Never use tanning beds. Tanning beds are associated with much higher risks of skin cancer.    All tanning damages the skin. Aim for ivory skin year round and you will have less trouble with your skin in years to come. There is no merit in getting \"a base tan\" before a warm weather vacation, as any tanning indicates your body's response to sun damage.    Stop smoking. Smokers have higher rates of skin cancer and also have premature skin wrinkling.    Use UPF sun-protective clothing, which while more expensive initially provides longer lasting coverage without having to worry about remembering to re-apply.  1. Wear a wide-brimmed hat and sunglasses.   2. Wear sun-protective clothing.  [employer] and other [employer] make sun protective clothing that are stylish, comfortable and cool.   [employer] and other [employer] make UV arm sleeves suitable for golfing, gardening and other activities.    Sunscreen instructions:  1. Use sunscreens with Zinc Oxide, Titanium Dioxide or Avobenzone to protect from UVA rays.  2. Use SPF 30-50+ to protect from UVB rays.  3. Re-apply every 2 hours even if water resistant.  4. Apply on your face every day even when cloudy and even in the winter. UVA \"aging rays\" penetrate window glass and are just as strong in the winter as in the summer.    FYI  You should use about 3 tablespoons of sunscreen to protect your whole body. Thus a typical eight ounce bottle of sunscreen should last 4 applications. Remember, that the SPF rating is compromised if you don t apply enough. Most people only " apply 1/2 - 1/3 of the amount they need. Also don t forget areas such as your ears, feet, upper back and harder to reach places. Keep in mind that these amounts should be increased for larger body sizes.    Sunscreens with titanium dioxide and/or zinc oxide in the active ingredients are physical blockers as opposed to chemical blockers. Chemical-free sunscreens should not irritate the skin.    Spray-on sunscreens may be used for touch-up application only, not as a base layer. Also, use with caution around small children due to inhalation risk.    SPF means sun protection factor, which is just the degree to which the sunscreen can protect against UVB rays. There is no rating system for UVA rays. SPF is calculated as the time skin will burn when sunscreen is applied vs. skin without sunscreen.    Water resistant sunscreens should be re-applied every 1-2 hours.    Product Recommendations:    Consider use of sunscreen sticks with Zinc Oxide and Titanium Dioxide active ingredients such as Neutrogena Pure&Free Baby Sunscreen Stick.    Good examples include: Blue Lizard, EltaMD, Solbar    Good daily moisturizers with SPF: Vanicream, CeraVe.    For sensitive skin, consider : SkinMedica Essential Defense Mineral Shield Broad Spectrum SPF 35    Men: consider use of Neutrogena Triple Protect Facial Lotion    Avoid retinyl palmitate products.  Avoid combination products that include both sunscreen and insect repellant, as sunscreen should be applied every 2 hours, but insect repellant should not be applied as frequently.    For more information:  https://www.skincancer.org/prevention/sun-protection/sunscreen/sunscreens-safe-and-effective    SKIN CANCER SELF-EXAM INSTRUCTIONS  Check every month in the mirror or with a household member. Be aware of any changes, especially bleeding or tenderness. Also, make sure to check your nails for color changes after removal of nail polish.    For melanoma, check for:  A - Asymmetry. One half  unlike the other half.  B - Border. Irregular, scalloped, ragged, notched, blurred or poorly defined borders.  C - Color. Color variations from one area to another, with shades of tan, brown and/or black present. Sometimes white, red or blue.  D - Diameter. Greater than 6 mm (about the size of a pencil eraser). Any new growth of a mole should be concerning and be evaluated.  E - Evolving. A mole or skin lesion that looks different from the rest or is changing in size, shape or color.    For basal cell carcinoma and squamous cell carcinoma, check for:    Sores, shiny bumps, nodules, scaly lesions, or wart-like growths that are itchy, tender, crusting, scabbing, eroding, oozing or bleeding.    Open sores/wounds or reddish/irritated areas that do not heal within 2-3 weeks.    Scar-like areas that are white, yellow or waxy in color.

## 2019-01-24 NOTE — PROGRESS NOTES
[facility] - PRIMARY CARE SKIN    CC: skin cancer screening (full-body)  SUBJECTIVE:   Laura Wilkes is a(n) 28 year old female who presents to clinic today for a full-body skin exam.    Bothersome lesions noticed by the patient or other skin concerns :  Issue One: She has noticed a lesion behind the right ear.  Issue Two: A mole on the left shoulder has changed shape.    Personal Medical History  Skin cancer: NO    Family Medical History  Skin cancer: YES - melanoma in mother at age 47    Sun Exposure History  Previous history of significant sun exposure:  Blistering sunburns: YES.  Sunscreen usage: YES.    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    Refer to electronic medical record (EMR) for past medical history and medications.    INTEGUMENTARY/SKIN: POSITIVE for changing lesion  ROS: 14 point review of systems was negative except the symptoms listed above in the HPI.    This document serves as a record of the services and decisions personally performed and made by Cindi Weldon MD and was created by Lico Delatorre, a trained medical scribe, based on personal observations and provider statements to the medical scribe.  January 24, 2019 10:43 AM   Lico Delatorre    OBJECTIVE:   GENERAL: healthy, alert and no distress.  SKIN: Wiley Skin Type - II.  This patient was examined from the top of the head to the bottom of the feet  including scalp, face, neck, trunk, buttocks, both arms, both legs, both hands, both feet, and all fingers and toes. The dermatoscope was used to help evaluate pigmented lesions.  Skin Pertinent Findings:  Trunk, Upper extremities: Scattered brown macules of various sizes and shapes most consistent with (benign) melanocytic nevi.    Significant Findings:  Right postauricular area: 4 mm in size slightly raised, red lesion(s) consistent with capillary hemangioma.  Name : Cautery.  Indication : capillary hemangioma.  Completed by : Nakita Weldon MD  Note :  "Prior to treatment, I discussed the risk of pain, blistering, infection, scarring, hypopigmentation, hyperpigmentation and recurrence or need for retreatment. Benefits of treatment and alternative treatments were also discussed.    Clean technique was used throughout the procedure. Skin was cleaned and prepped with surgical cleanser. Infiltrated with 1 % lidocaine, Cauterized with Hyfrecator at 6 setting.  Antibiotic salve and dressing were applied.    Patient tolerated the procedure well and left in satisfactory condition.    ASSESSMENT:     Encounter Diagnoses   Name Primary?     Skin cancer screening Yes     Family history of malignant melanoma of skin      Capillary hemangioma of skin      Multiple benign melanocytic nevi          PLAN:   Patient Instructions   FUTURE APPOINTMENTS  Follow up in 1 year(s) for a full-body skin cancer screening.    Apply Vaseline to the cautery site behind the right ear until the area heals.    SUN PROTECTION INSTRUCTIONS  Sun damage can lead to skin cancer and premature aging of the skin.      The best way to protect from sun damage to your skin is to avoid the sun during peak hours (10 am - 2 pm) even on overcast days.    Never use tanning beds. Tanning beds are associated with much higher risks of skin cancer.    All tanning damages the skin. Aim for ivory skin year round and you will have less trouble with your skin in years to come. There is no merit in getting \"a base tan\" before a warm weather vacation, as any tanning indicates your body's response to sun damage.    Stop smoking. Smokers have higher rates of skin cancer and also have premature skin wrinkling.    Use UPF sun-protective clothing, which while more expensive initially provides longer lasting coverage without having to worry about remembering to re-apply.  1. Wear a wide-brimmed hat and sunglasses.   2. Wear sun-protective clothing.  [employer] and other [employer] make sun protective clothing that are " "stylish, comfortable and cool.   [employer] and other [employer] make UV arm sleeves suitable for golfing, gardening and other activities.    Sunscreen instructions:  1. Use sunscreens with Zinc Oxide, Titanium Dioxide or Avobenzone to protect from UVA rays.  2. Use SPF 30-50+ to protect from UVB rays.  3. Re-apply every 2 hours even if water resistant.  4. Apply on your face every day even when cloudy and even in the winter. UVA \"aging rays\" penetrate window glass and are just as strong in the winter as in the summer.    FYI  You should use about 3 tablespoons of sunscreen to protect your whole body. Thus a typical eight ounce bottle of sunscreen should last 4 applications. Remember, that the SPF rating is compromised if you don t apply enough. Most people only apply 1/2 - 1/3 of the amount they need. Also don t forget areas such as your ears, feet, upper back and harder to reach places. Keep in mind that these amounts should be increased for larger body sizes.    Sunscreens with titanium dioxide and/or zinc oxide in the active ingredients are physical blockers as opposed to chemical blockers. Chemical-free sunscreens should not irritate the skin.    Spray-on sunscreens may be used for touch-up application only, not as a base layer. Also, use with caution around small children due to inhalation risk.    SPF means sun protection factor, which is just the degree to which the sunscreen can protect against UVB rays. There is no rating system for UVA rays. SPF is calculated as the time skin will burn when sunscreen is applied vs. skin without sunscreen.    Water resistant sunscreens should be re-applied every 1-2 hours.    Product Recommendations:    Consider use of sunscreen sticks with Zinc Oxide and Titanium Dioxide active ingredients such as Neutrogena Pure&Free Baby Sunscreen Stick.    Good examples include: Blue Lizard, EltaMD, Solbar    Good daily moisturizers with SPF: Vanicream CeraVe.    For sensitive " skin, consider : SkinMedica Essential Defense Mineral Shield Broad Spectrum SPF 35    Men: consider use of Neutrogena Triple Protect Facial Lotion    Avoid retinyl palmitate products.  Avoid combination products that include both sunscreen and insect repellant, as sunscreen should be applied every 2 hours, but insect repellant should not be applied as frequently.    For more information:  https://www.skincancer.org/prevention/sun-protection/sunscreen/sunscreens-safe-and-effective    SKIN CANCER SELF-EXAM INSTRUCTIONS  Check every month in the mirror or with a household member. Be aware of any changes, especially bleeding or tenderness. Also, make sure to check your nails for color changes after removal of nail polish.    For melanoma, check for:  A - Asymmetry. One half unlike the other half.  B - Border. Irregular, scalloped, ragged, notched, blurred or poorly defined borders.  C - Color. Color variations from one area to another, with shades of tan, brown and/or black present. Sometimes white, red or blue.  D - Diameter. Greater than 6 mm (about the size of a pencil eraser). Any new growth of a mole should be concerning and be evaluated.  E - Evolving. A mole or skin lesion that looks different from the rest or is changing in size, shape or color.    For basal cell carcinoma and squamous cell carcinoma, check for:    Sores, shiny bumps, nodules, scaly lesions, or wart-like growths that are itchy, tender, crusting, scabbing, eroding, oozing or bleeding.    Open sores/wounds or reddish/irritated areas that do not heal within 2-3 weeks.    Scar-like areas that are white, yellow or waxy in color.      The patient was counseled about sunscreens and sun avoidance. The patient was counseled to check the skin regularly and report any lesion that is new, changing, itching, scabbing, bleeding or otherwise bothersome. The patient was discharged ambulatory and in stable condition.    TT: 25 minutes.  CT: 15 minutes.    The  information in this document, created by the medical scribe for me, accurately reflects the services I personally performed and the decisions made by me. I have reviewed and approved this document for accuracy prior to leaving the patient care area.  January 24, 2019 10:43 AM  Cindi Weldon MD  [facility] [place]

## 2019-01-24 NOTE — LETTER
1/24/2019         RE: Laura Wilkes  [address] [facility] [address]        Dear Colleague,    Thank you for referring your patient, Laura Wilkes, to the [facility] [place]. Please see a copy of my visit note below.    [facility] [place] - PRIMARY CARE SKIN    CC: skin cancer screening (full-body)  SUBJECTIVE:   Laura Wilkes is a(n) 28 year old female who presents to clinic today for a full-body skin exam.    Bothersome lesions noticed by the patient or other skin concerns :  Issue One: She has noticed a lesion behind the right ear.  Issue Two: A mole on the left shoulder has changed shape.    Personal Medical History  Skin cancer: NO    Family Medical History  Skin cancer: YES - melanoma in mother at age 47    Sun Exposure History  Previous history of significant sun exposure:  Blistering sunburns: YES.  Sunscreen usage: YES.    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    Refer to electronic medical record (EMR) for past medical history and medications.    INTEGUMENTARY/SKIN: POSITIVE for changing lesion  ROS: 14 point review of systems was negative except the symptoms listed above in the HPI.    This document serves as a record of the services and decisions personally performed and made by Cindi Weldon MD and was created by Lico Delatorre, a trained medical scribe, based on personal observations and provider statements to the medical scribe.  January 24, 2019 10:43 AM   Lico Delatorre    OBJECTIVE:   GENERAL: healthy, alert and no distress.  SKIN: Wiley Skin Type - II.  This patient was examined from the top of the head to the bottom of the feet  including scalp, face, neck, trunk, buttocks, both arms, both legs, both hands, both feet, and all fingers and toes. The dermatoscope was used to help evaluate pigmented lesions.  Skin Pertinent Findings:  Trunk, Upper extremities: Scattered brown macules of various sizes and shapes most consistent  "with (benign) melanocytic nevi.    Significant Findings:  Right postauricular area: 4 mm in size slightly raised, red lesion(s) consistent with capillary hemangioma.  Name : Cautery.  Indication : capillary hemangioma.  Completed by : Nakita Weldon MD  Note : Prior to treatment, I discussed the risk of pain, blistering, infection, scarring, hypopigmentation, hyperpigmentation and recurrence or need for retreatment. Benefits of treatment and alternative treatments were also discussed.    Clean technique was used throughout the procedure. Skin was cleaned and prepped with surgical cleanser. Infiltrated with 1 % lidocaine, Cauterized with Hyfrecator at 6 setting.  Antibiotic salve and dressing were applied.    Patient tolerated the procedure well and left in satisfactory condition.    ASSESSMENT:     Encounter Diagnoses   Name Primary?     Skin cancer screening Yes     Family history of malignant melanoma of skin      Capillary hemangioma of skin      Multiple benign melanocytic nevi          PLAN:   Patient Instructions   FUTURE APPOINTMENTS  Follow up in 1 year(s) for a full-body skin cancer screening.    Apply Vaseline to the cautery site behind the right ear until the area heals.    SUN PROTECTION INSTRUCTIONS  Sun damage can lead to skin cancer and premature aging of the skin.      The best way to protect from sun damage to your skin is to avoid the sun during peak hours (10 am - 2 pm) even on overcast days.    Never use tanning beds. Tanning beds are associated with much higher risks of skin cancer.    All tanning damages the skin. Aim for ivory skin year round and you will have less trouble with your skin in years to come. There is no merit in getting \"a base tan\" before a warm weather vacation, as any tanning indicates your body's response to sun damage.    Stop smoking. Smokers have higher rates of skin cancer and also have premature skin wrinkling.    Use UPF sun-protective clothing, which while more " "expensive initially provides longer lasting coverage without having to worry about remembering to re-apply.  1. Wear a wide-brimmed hat and sunglasses.   2. Wear sun-protective clothing.  [employer] and other [employer] make sun protective clothing that are stylish, comfortable and cool.   [employer] and other [employer] make UV arm sleeves suitable for golfing, gardening and other activities.    Sunscreen instructions:  1. Use sunscreens with Zinc Oxide, Titanium Dioxide or Avobenzone to protect from UVA rays.  2. Use SPF 30-50+ to protect from UVB rays.  3. Re-apply every 2 hours even if water resistant.  4. Apply on your face every day even when cloudy and even in the winter. UVA \"aging rays\" penetrate window glass and are just as strong in the winter as in the summer.    FYI  You should use about 3 tablespoons of sunscreen to protect your whole body. Thus a typical eight ounce bottle of sunscreen should last 4 applications. Remember, that the SPF rating is compromised if you don t apply enough. Most people only apply 1/2 - 1/3 of the amount they need. Also don t forget areas such as your ears, feet, upper back and harder to reach places. Keep in mind that these amounts should be increased for larger body sizes.    Sunscreens with titanium dioxide and/or zinc oxide in the active ingredients are physical blockers as opposed to chemical blockers. Chemical-free sunscreens should not irritate the skin.    Spray-on sunscreens may be used for touch-up application only, not as a base layer. Also, use with caution around small children due to inhalation risk.    SPF means sun protection factor, which is just the degree to which the sunscreen can protect against UVB rays. There is no rating system for UVA rays. SPF is calculated as the time skin will burn when sunscreen is applied vs. skin without sunscreen.    Water resistant sunscreens should be re-applied every 1-2 hours.    Product " Recommendations:    Consider use of sunscreen sticks with Zinc Oxide and Titanium Dioxide active ingredients such as Neutrogena Pure&Free Baby Sunscreen Stick.    Good examples include: Blue Lizard, EltaMD, Solbar    Good daily moisturizers with SPF: Vanicream, CeraVe.    For sensitive skin, consider : SkinMedica Essential Defense Mineral Shield Broad Spectrum SPF 35    Men: consider use of Neutrogena Triple Protect Facial Lotion    Avoid retinyl palmitate products.  Avoid combination products that include both sunscreen and insect repellant, as sunscreen should be applied every 2 hours, but insect repellant should not be applied as frequently.    For more information:  https://www.skincancer.org/prevention/sun-protection/sunscreen/sunscreens-safe-and-effective    SKIN CANCER SELF-EXAM INSTRUCTIONS  Check every month in the mirror or with a household member. Be aware of any changes, especially bleeding or tenderness. Also, make sure to check your nails for color changes after removal of nail polish.    For melanoma, check for:  A - Asymmetry. One half unlike the other half.  B - Border. Irregular, scalloped, ragged, notched, blurred or poorly defined borders.  C - Color. Color variations from one area to another, with shades of tan, brown and/or black present. Sometimes white, red or blue.  D - Diameter. Greater than 6 mm (about the size of a pencil eraser). Any new growth of a mole should be concerning and be evaluated.  E - Evolving. A mole or skin lesion that looks different from the rest or is changing in size, shape or color.    For basal cell carcinoma and squamous cell carcinoma, check for:    Sores, shiny bumps, nodules, scaly lesions, or wart-like growths that are itchy, tender, crusting, scabbing, eroding, oozing or bleeding.    Open sores/wounds or reddish/irritated areas that do not heal within 2-3 weeks.    Scar-like areas that are white, yellow or waxy in color.      The patient was counseled about  sunscreens and sun avoidance. The patient was counseled to check the skin regularly and report any lesion that is new, changing, itching, scabbing, bleeding or otherwise bothersome. The patient was discharged ambulatory and in stable condition.    TT: 25 minutes.  CT: 15 minutes.    The information in this document, created by the medical scribe for me, accurately reflects the services I personally performed and the decisions made by me. I have reviewed and approved this document for accuracy prior to leaving the patient care area.  January 24, 2019 10:43 AM  Cindi Weldon MD  [facility] [place]    Again, thank you for allowing me to participate in the care of your patient.        Sincerely,        Cindi Weldon MD

## 2019-12-05 ENCOUNTER — TELEPHONE (OUTPATIENT)
Dept: FAMILY MEDICINE | Facility: CLINIC | Age: 29
End: 2019-12-05

## 2019-12-05 ENCOUNTER — OFFICE VISIT (OUTPATIENT)
Dept: PODIATRY | Facility: CLINIC | Age: 29
End: 2019-12-05
Payer: COMMERCIAL

## 2019-12-05 VITALS
SYSTOLIC BLOOD PRESSURE: 112 MMHG | HEIGHT: 66 IN | BODY MASS INDEX: 23.14 KG/M2 | DIASTOLIC BLOOD PRESSURE: 74 MMHG | WEIGHT: 144 LBS

## 2019-12-05 DIAGNOSIS — B07.0 PLANTAR WARTS: Primary | ICD-10-CM

## 2019-12-05 DIAGNOSIS — M79.672 PAIN OF LEFT HEEL: ICD-10-CM

## 2019-12-05 PROCEDURE — 17110 DESTRUCTION B9 LES UP TO 14: CPT | Performed by: PODIATRIST

## 2019-12-05 PROCEDURE — 99203 OFFICE O/P NEW LOW 30 MIN: CPT | Mod: 25 | Performed by: PODIATRIST

## 2019-12-05 ASSESSMENT — MIFFLIN-ST. JEOR: SCORE: 1384.93

## 2019-12-05 NOTE — TELEPHONE ENCOUNTER
Panel Management Review      Patient has the following on her problem list:     Asthma review     ACT Total Scores 8/21/2017   ACT TOTAL SCORE -   ASTHMA ER VISITS -   ASTHMA HOSPITALIZATIONS -   ACT TOTAL SCORE (Goal Greater than or Equal to 20) 18   In the past 12 months, how many times did you visit the emergency room for your asthma without being admitted to the hospital? 0   In the past 12 months, how many times were you hospitalized overnight because of your asthma? 0      1. Is Asthma diagnosis on the Problem List? Yes    2. Is Asthma listed on Health Maintenance? Yes    3. Patient is due for:  ACT and AAP      Composite cancer screening  Chart review shows that this patient is due/due soon for the following None  Summary:    Patient is due/failing the following:   AAP and ACT    Action needed:   Patient needs to do ACT.    Type of outreach:    Phone, left message for patient to call back.     Questions for provider review:    None                                                                                                                                    Nohelia Young, [facility]       Chart routed to Care Team .

## 2019-12-05 NOTE — LETTER
"    12/5/2019         RE: Laura Wilkes  [address] [address]        Dear Colleague,    Thank you for referring your patient, Laura Wilkes, to the [facility]. Please see a copy of my visit note below.      ASSESSMENT/PLAN:    Encounter Diagnoses   Name Primary?     Plantar warts, left heel Yes     Pain of left heel    The cause and nature of plantar warts was discussed.  It was explained that they are very resilient lesions and tend to require multiple treatments, regardless of type of treatment.  They sometimes will resolve without treatment.  Some people opt to not treat and see if they resolve.  Others choose to treat because they can spread, grow in size, and cause pain.     Treatment options discussed included:    1) self treatment via filing and use of a topical, over-the-counter preparation    2) excisional debridement with liquid nitrogen application    3) excisional debridement with application Cantharidin 10mg / Podophyllum Resin 50mg/ Salicylic acid 30mg/ml Application.    4) referral to [facility] or Dermatology    Laura Wilkes elected to Cantharidin Pluse.      Procedure:  Cantharidin 10mg / Podophyllum Resin 50mg/ Salicylic acid 30mg/ml Application.    The procedure was discussed, including risk of infection and pain. Written and verbal consent was obtained.   The site was marked and a \"Time Out\" called.      The plantar warts were sharply debrided to pinpoint bleeding via #15 blade. A compounded topical suspension consisting of Cantharidin 10mg / Podophyllum Resin 50mg/ Salicylic acid 30mg/ml was applied to the verruca and surrounding skin via a small applicator stick. This was tolerated well by the patient. The area was allowed to air dry, prior to dressing application. It was explained that this medication will cause some discomfort, potential blistering and redness.  Over the counter pain reliever, ice and elevation is recommended, as " needed. Follow up in 2-3 weeks for ongoing treatment if needed.         Body mass index is 23.24 kg/m .          Giovanny Haley DPM, FACFAS, MS    [facility] of Podiatry/Foot & Ankle Surgery      ____________________________________________________________________    HPI:         Chief Complaint: warts, left heel  Onset of problem: 8 years  Painful with weight bearing  Ratin/10   Frequency:  daily    Previous treatment: OTC applications and freezing    *  Patient Active Problem List   Diagnosis     Family history of malignant melanoma of skin     Asthma, exercise induced     Left knee pain     Acute appendicitis   *  *  Past Surgical History:   Procedure Laterality Date     APPENDECTOMY N/A      LAPAROSCOPIC APPENDECTOMY N/A 3/27/2017    Procedure: LAPAROSCOPIC APPENDECTOMY;  Surgeon: Cathi Nunez MD;  Location:  OR   *  *  Current Outpatient Medications   Medication Sig Dispense Refill     albuterol (ALBUTEROL) 108 (90 BASE) MCG/ACT Inhaler Inhale 1-2 puffs into the lungs every 6 hours as needed for shortness of breath / dyspnea (Patient not taking: Reported on 2019) 1 Inhaler 6     FLOVENT  MCG/ACT Inhaler INHALE 2 PUFFS BY MOUTH TWICE DAILY (Patient not taking: Reported on 2018) 12 g 1     levonorgestrel (MIRENA, 52 MG,) 20 MCG/24HR IUD 1 each (20 mcg) by Intrauterine route once for 1 dose 1 each 0     triamcinolone (KENALOG) 0.1 % cream Apply to affected areas on legs, arms or trunk bid-tid for 10-14 consecutive days, not to be used on face or groin (Patient not taking: Reported on 2019) 80 g 0       ROS:     A 10-point review of systems was performed and is positive for that noted above in the HPI and as seen below.  All other areas are negative.     Numbness in feet?  no   Calf pain with walking? no  Recent foot/ankle injury? no  Weight change over past 12 months? no  Self perception as overweight? no  Recent flu-like symptoms? no  Joint pain other than feet  "? no    Social History: Employment:  teacher;  Exercise/Physical activity:  5-6 days/wekk;  Tobacco use:  no  Social History     Socioeconomic History     Marital status:      Spouse name: Not on file     Number of children: Not on file     Years of education: Not on file     Highest education level: Not on file   Occupational History     Not on file   Social Needs     Financial resource strain: Not on file     Food insecurity:     Worry: Not on file     Inability: Not on file     Transportation needs:     Medical: Not on file     Non-medical: Not on file   Tobacco Use     Smoking status: Never Smoker     Smokeless tobacco: Never Used   Substance and Sexual Activity     Alcohol use: No     Alcohol/week: 0.0 standard drinks     Comment: 3-4 drinks week     Drug use: No     Sexual activity: Yes     Partners: Male     Birth control/protection: Condom   Lifestyle     Physical activity:     Days per week: Not on file     Minutes per session: Not on file     Stress: Not on file   Relationships     Social connections:     Talks on phone: Not on file     Gets together: Not on file     Attends Episcopalian service: Not on file     Active member of club or organization: Not on file     Attends meetings of clubs or organizations: Not on file     Relationship status: Not on file     Intimate partner violence:     Fear of current or ex partner: Not on file     Emotionally abused: Not on file     Physically abused: Not on file     Forced sexual activity: Not on file   Other Topics Concern     Parent/sibling w/ CABG, MI or angioplasty before 65F 55M? No   Social History Narrative    Going back to get Masters in teaching science    Wants to teach abroad    : Pavel    Cat: Jun    Hobbies: Biking       Family history:  Family History   Problem Relation Age of Onset     Melanoma Mother 52       Rheumatoid arthritis:  no  Foot Problems: no  Diabetes: no      EXAM:    Vitals: /74   Ht 1.676 m (5' 6\")   Wt 65.3 kg " "(144 lb)   BMI 23.24 kg/m     BMI: Body mass index is 23.24 kg/m .  Height: 5' 6\"    Constitutional/ general:  Pt is in no apparent distress, appears well-nourished.  Cooperative with history and physical exam.     Vascular:  Pedal pulses are palpable bilaterally for both the DP and PT arteries.  CFT < 3 sec.  No edema.  Pedal hair growth noted.     Neuro:  Alert and oriented x 3. Coordinated gait.  Light touch sensation is intact to the L4, L5, S1 distributions. No obvious deficits.  No evidence of neurological-based weakness, spasticity, or contracture in the lower extremities.     Derm: Normal texture and turgor.  No erythema, ecchymosis, or cyanosis.  No open lesions. Mosaic type plantar wart, cluster of 5-6 lesions, plantar medial left heel.     Musculoskeletal:    Lower extremity muscle strength is normal.  Patient is ambulatory without an assistive device or brace .  No gross deformities.               Again, thank you for allowing me to participate in the care of your patient.        Sincerely,        Giovanny Haley, DEENA    "

## 2019-12-05 NOTE — PATIENT INSTRUCTIONS
Thank you for choosing [facility] Podiatry / Foot & Ankle Surgery!    DR. WEINSTEIN[facility]S CLINIC LOCATIONS     MONDAY - [place] WEDNESDAY (AM ONLY) - [place]   [address] [place] [address] [address] [address]   [phone] / [phone] / [phone]       THURSDAY - [place] SCHEDULE SURGERY: [phone]   [address] APPOINTMENTS: [phone]   [place] [address] BILLING QUESTIONS: [phone] / [phone]       PLANTAR WARTS   Plantar warts are a viral skin infection. As with most viral infections, there is no cure, only treatment. The virus is also quite superficial, so the immune system cannot recognize it as a problem. Therefore, treatment is aimed at causing an insult that the immune system can recognize. Typically plantar warts are treated by applying liquid nitrogen, to cause a local frostbite injury, or a strong acid, to cause a blister. Sometimes medications or creams that boost immune response are prescribed.     If your treatment was with the strong acid (phenol, tri-chlor, cantharadine), you will likely develop a very tender, brown fluid-filled blister. This is normal and the blister should be allowed to break on its own and dry out. Once it is dried out, use a pumice stone to trim away the dry skin and use an over-the-counter salicylic acid product in between appointments. Call the clinic if you have any concerns regarding your blister.     The regimen between office visits should include: daily trimming with the pumice stone, application of the OTC product, and dressing with a small band-aid or piece of duct tape. You should repeat this daily until your next visit in 2-3 weeks. There is a prescription cream as well (Aldera) that can be applied between visits. This can sometimes cause surrounding skin irritation.    Duct tape is a non invasive treatment to warts. Usually requires reapplying it every night. It helps  "to \"choke out\" the wart. Trimming the wart down with a razor first may also help.     Again, because there is no cure for warts, you may have 6 or more visits depending on how your wart responds. Please call the clinic if you have questions or concerns.           FYI: BODY WEIGHT AND YOUR FEET  The following information is included in the after visit summary for all patients. Body weight can be a sensitive issue to discuss in clinic, but we think the following information is very important. Although we focus on the feet and ankles, we do support the overall health of our patients.     Many things can cause foot and ankle problems. Foot structure, activity level, foot mechanics and injuries are common causes of pain. One very important issue that often goes unmentioned, is body weight. Extra weight can cause increased stress on muscles, ligaments, bones and tendons. Sometimes just a few extra pounds is all it takes to put one over her/his threshold. Without reducing that stress, it can be difficult to alleviate pain. As Foot & Ankle specialists, our job is addressing the lower extremity problem and possible causes. Regarding extra body weight, we encourage patients to discuss diet and weight management plans with their primary care doctors. It is this team approach that gives you the best opportunity for pain relief and getting you back on your feet.      [facility] has a Comprehensive Weight Management Program. This program includes counseling, education, non-surgical and surgical approaches to weight loss. If you are interested in learning more either talk to you primary care provider or call [phone].      "

## 2019-12-05 NOTE — PROGRESS NOTES
"  ASSESSMENT/PLAN:    Encounter Diagnoses   Name Primary?     Plantar warts, left heel Yes     Pain of left heel    The cause and nature of plantar warts was discussed.  It was explained that they are very resilient lesions and tend to require multiple treatments, regardless of type of treatment.  They sometimes will resolve without treatment.  Some people opt to not treat and see if they resolve.  Others choose to treat because they can spread, grow in size, and cause pain.     Treatment options discussed included:    1) self treatment via filing and use of a topical, over-the-counter preparation    2) excisional debridement with liquid nitrogen application    3) excisional debridement with application Cantharidin 10mg / Podophyllum Resin 50mg/ Salicylic acid 30mg/ml Application.    4) referral to [facility] or Dermatology    Laura Wilkes elected to Cantharidin Pluse.      Procedure:  Cantharidin 10mg / Podophyllum Resin 50mg/ Salicylic acid 30mg/ml Application.    The procedure was discussed, including risk of infection and pain. Written and verbal consent was obtained.   The site was marked and a \"Time Out\" called.      The plantar warts were sharply debrided to pinpoint bleeding via #15 blade. A compounded topical suspension consisting of Cantharidin 10mg / Podophyllum Resin 50mg/ Salicylic acid 30mg/ml was applied to the verruca and surrounding skin via a small applicator stick. This was tolerated well by the patient. The area was allowed to air dry, prior to dressing application. It was explained that this medication will cause some discomfort, potential blistering and redness.  Over the counter pain reliever, ice and elevation is recommended, as needed. Follow up in 2-3 weeks for ongoing treatment if needed.         Body mass index is 23.24 kg/m .          Giovanny Haley DPM, FACFAS, MS    [facility] Department of Podiatry/Foot & Ankle " Surgery      ____________________________________________________________________    HPI:         Chief Complaint: warts, left heel  Onset of problem: 8 years  Painful with weight bearing  Ratin/10   Frequency:  daily    Previous treatment: OTC applications and freezing    *  Patient Active Problem List   Diagnosis     Family history of malignant melanoma of skin     Asthma, exercise induced     Left knee pain     Acute appendicitis   *  *  Past Surgical History:   Procedure Laterality Date     APPENDECTOMY N/A      LAPAROSCOPIC APPENDECTOMY N/A 3/27/2017    Procedure: LAPAROSCOPIC APPENDECTOMY;  Surgeon: Cathi Nunez MD;  Location:  OR   *  *  Current Outpatient Medications   Medication Sig Dispense Refill     albuterol (ALBUTEROL) 108 (90 BASE) MCG/ACT Inhaler Inhale 1-2 puffs into the lungs every 6 hours as needed for shortness of breath / dyspnea (Patient not taking: Reported on 2019) 1 Inhaler 6     FLOVENT  MCG/ACT Inhaler INHALE 2 PUFFS BY MOUTH TWICE DAILY (Patient not taking: Reported on 2018) 12 g 1     levonorgestrel (MIRENA, 52 MG,) 20 MCG/24HR IUD 1 each (20 mcg) by Intrauterine route once for 1 dose 1 each 0     triamcinolone (KENALOG) 0.1 % cream Apply to affected areas on legs, arms or trunk bid-tid for 10-14 consecutive days, not to be used on face or groin (Patient not taking: Reported on 2019) 80 g 0       ROS:     A 10-point review of systems was performed and is positive for that noted above in the HPI and as seen below.  All other areas are negative.     Numbness in feet?  no   Calf pain with walking? no  Recent foot/ankle injury? no  Weight change over past 12 months? no  Self perception as overweight? no  Recent flu-like symptoms? no  Joint pain other than feet ? no    Social History: Employment:  teacher;  Exercise/Physical activity:  5-6 days/wekk;  Tobacco use:  no  Social History     Socioeconomic History     Marital status:      Spouse  "name: Not on file     Number of children: Not on file     Years of education: Not on file     Highest education level: Not on file   Occupational History     Not on file   Social Needs     Financial resource strain: Not on file     Food insecurity:     Worry: Not on file     Inability: Not on file     Transportation needs:     Medical: Not on file     Non-medical: Not on file   Tobacco Use     Smoking status: Never Smoker     Smokeless tobacco: Never Used   Substance and Sexual Activity     Alcohol use: No     Alcohol/week: 0.0 standard drinks     Comment: 3-4 drinks week     Drug use: No     Sexual activity: Yes     Partners: Male     Birth control/protection: Condom   Lifestyle     Physical activity:     Days per week: Not on file     Minutes per session: Not on file     Stress: Not on file   Relationships     Social connections:     Talks on phone: Not on file     Gets together: Not on file     Attends Sabianist service: Not on file     Active member of club or organization: Not on file     Attends meetings of clubs or organizations: Not on file     Relationship status: Not on file     Intimate partner violence:     Fear of current or ex partner: Not on file     Emotionally abused: Not on file     Physically abused: Not on file     Forced sexual activity: Not on file   Other Topics Concern     Parent/sibling w/ CABG, MI or angioplasty before 65F 55M? No   Social History Narrative    Going back to get Masters in teaching science    Wants to teach abroad    : [employer]avel    Cat: Jun    Hobbies: Biking       Family history:  Family History   Problem Relation Age of Onset     Melanoma Mother 52       Rheumatoid arthritis:  no  Foot Problems: no  Diabetes: no      EXAM:    Vitals: /74   Ht 1.676 m (5' 6\")   Wt 65.3 kg (144 lb)   BMI 23.24 kg/m    BMI: Body mass index is 23.24 kg/m .  Height: 5' 6\"    Constitutional/ general:  Pt is in no apparent distress, appears well-nourished.  Cooperative with history " and physical exam.     Vascular:  Pedal pulses are palpable bilaterally for both the DP and PT arteries.  CFT < 3 sec.  No edema.  Pedal hair growth noted.     Neuro:  Alert and oriented x 3. Coordinated gait.  Light touch sensation is intact to the L4, L5, S1 distributions. No obvious deficits.  No evidence of neurological-based weakness, spasticity, or contracture in the lower extremities.     Derm: Normal texture and turgor.  No erythema, ecchymosis, or cyanosis.  No open lesions. Mosaic type plantar wart, cluster of 5-6 lesions, plantar medial left heel.     Musculoskeletal:    Lower extremity muscle strength is normal.  Patient is ambulatory without an assistive device or brace .  No gross deformities.

## 2020-01-09 ENCOUNTER — TELEPHONE (OUTPATIENT)
Dept: FAMILY MEDICINE | Facility: CLINIC | Age: 30
End: 2020-01-09

## 2020-01-09 NOTE — TELEPHONE ENCOUNTER
Panel Management Review      Patient has the following on her problem list:     Asthma review     ACT Total Scores 8/21/2017   ACT TOTAL SCORE -   ASTHMA ER VISITS -   ASTHMA HOSPITALIZATIONS -   ACT TOTAL SCORE (Goal Greater than or Equal to 20) 18   In the past 12 months, how many times did you visit the emergency room for your asthma without being admitted to the hospital? 0   In the past 12 months, how many times were you hospitalized overnight because of your asthma? 0      1. Is Asthma diagnosis on the Problem List? Yes    2. Is Asthma listed on Health Maintenance? Yes    3. Patient is due for:  ACT and AAP      Composite cancer screening  Chart review shows that this patient is due/due soon for the following None  Summary:    Patient is due/failing the following:   AAP and ACT    Action needed:   Patient needs to do ACT.    Type of outreach:    Phone, left message for patient to call back.     Questions for provider review:    None                                                                                                                                    Nohelia Young, [facility]       Chart routed to Care Team .

## 2020-01-23 ENCOUNTER — TELEPHONE (OUTPATIENT)
Dept: FAMILY MEDICINE | Facility: CLINIC | Age: 30
End: 2020-01-23

## 2020-01-31 NOTE — TELEPHONE ENCOUNTER
Panel Management Review      Patient has the following on her problem list:     Asthma review     ACT Total Scores 8/21/2017   ACT TOTAL SCORE -   ASTHMA ER VISITS -   ASTHMA HOSPITALIZATIONS -   ACT TOTAL SCORE (Goal Greater than or Equal to 20) 18   In the past 12 months, how many times did you visit the emergency room for your asthma without being admitted to the hospital? 0   In the past 12 months, how many times were you hospitalized overnight because of your asthma? 0      1. Is Asthma diagnosis on the Problem List? Yes    2. Is Asthma listed on Health Maintenance? Yes    3. Patient is due for:  ACT and AAP      Composite cancer screening  Chart review shows that this patient is due/due soon for the following None  Summary:    Patient is due/failing the following:   AAP and ACT    Action needed:   Patient needs office visit for physical. and Patient needs to do ACT.    Type of outreach:    Phone, left message for patient to call back.     Questions for provider review:    None                                                                                                                                    Nohelia oYung, [facility]       Chart routed to Care Team .

## 2020-08-04 ENCOUNTER — TELEPHONE (OUTPATIENT)
Dept: PODIATRY | Facility: CLINIC | Age: 30
End: 2020-08-04

## 2020-08-04 DIAGNOSIS — B07.0 PLANTAR WART: Primary | ICD-10-CM

## 2020-08-04 NOTE — TELEPHONE ENCOUNTER
Patient called today.    Patient scheduled an appointment at [facility] with Dr. Bae on August 11.    There was a strange pop up message when scheduling this appointment.  See below        I am not sure what this all means but called the clinic and spoke with a TC that saw this too, and has no idea either.    Wondering if Dr. Bae's team can change to a 30 min appointment?    Thank you.    Central Scheduling  Demetria KATHLEEN

## 2020-08-11 ENCOUNTER — OFFICE VISIT (OUTPATIENT)
Dept: PODIATRY | Facility: CLINIC | Age: 30
End: 2020-08-11
Payer: COMMERCIAL

## 2020-08-11 VITALS
WEIGHT: 135 LBS | HEIGHT: 66 IN | DIASTOLIC BLOOD PRESSURE: 78 MMHG | BODY MASS INDEX: 21.69 KG/M2 | SYSTOLIC BLOOD PRESSURE: 126 MMHG

## 2020-08-11 DIAGNOSIS — B07.0 PLANTAR WART: Primary | ICD-10-CM

## 2020-08-11 PROCEDURE — 99214 OFFICE O/P EST MOD 30 MIN: CPT | Performed by: PODIATRIST

## 2020-08-11 RX ORDER — FLUOROURACIL 50 MG/G
CREAM TOPICAL
Qty: 40 G | Refills: 0 | Status: SHIPPED | OUTPATIENT
Start: 2020-08-11 | End: 2020-08-13

## 2020-08-11 ASSESSMENT — MIFFLIN-ST. JEOR: SCORE: 1344.11

## 2020-08-11 NOTE — LETTER
"    8/11/2020         RE: Laura Wilkes  [address] [facility] [address]        Dear Colleague,    Thank you for referring your patient, Laura Wilkes, to the [facility]. Please see a copy of my visit note below.    PATIENT HISTORY:  Laura Wilkes is a 29 year old female who presents to clinic for L heel wart.  Present for years.  Area is increasing in size.  Prior cantharidin application with Dr. Haley late last year.   Pt had had freezing in the past.  She reports doing some home treatment since last visit, but not consistently.  She has tried topical sal acid.  No pain currently.  No fevers, chills, injury.  Nonsmoker.  Nondiabetic.  No related family hx.       EXAM:Vitals: /78   Ht 1.676 m (5' 6\")   Wt 61.2 kg (135 lb)   BMI 21.79 kg/m    BMI= Body mass index is 21.79 kg/m .    General appearance: Patient is alert and fully cooperative with history & exam.  No sign of distress is noted during the visit.     Dermatologic: approx 1cm diameter mosaic area of verrucous lesions to plantar medial L heel area.   No paronychia or evidence of soft tissue infection is noted.     Vascular: DP & PT pulses are intact & regular on the L.  No significant edema or varicosities noted.  CFT and skin temperature are normal to both lower extremities.     Neurologic: Lower extremity sensation is intact to light touch.  No evidence of weakness or contracture in the lower extremities.  No evidence of neuropathy.     Musculoskeletal: Patient is ambulatory without assistive device or brace.  No gross ankle deformity noted.  No foot or ankle joint effusion is noted.     ASSESSMENT: L heel plantar warts     PLAN:  Reviewed patient's chart in epic.  Discussed condition and treatment options including pros and cons.    Discussed wart treatment options. Warts are caused by a virus. There is no cure, only treatment. Treatment options include freezing, cantharidin, occlusion, home treatments, " prescription topicals.  Also discussed Derm referral.    Pt elected to try Rx topical, as she feels she has tried other treatments w/o improvement.  Efudex prescribed.  Pt will f/u with her Dermatologist if not improving.    F/u with me as needed.    Enrique Bae DPM, FACFAS            Again, thank you for allowing me to participate in the care of your patient.        Sincerely,        Enrique Bae DPM

## 2020-08-11 NOTE — PROGRESS NOTES
"PATIENT HISTORY:  Laura Wilkes is a 29 year old female who presents to clinic for L heel wart.  Present for years.  Area is increasing in size.  Prior cantharidin application with Dr. Haley late last year.   Pt had had freezing in the past.  She reports doing some home treatment since last visit, but not consistently.  She has tried topical sal acid.  No pain currently.  No fevers, chills, injury.  Nonsmoker.  Nondiabetic.  No related family hx.       EXAM:Vitals: /78   Ht 1.676 m (5' 6\")   Wt 61.2 kg (135 lb)   BMI 21.79 kg/m    BMI= Body mass index is 21.79 kg/m .    General appearance: Patient is alert and fully cooperative with history & exam.  No sign of distress is noted during the visit.     Dermatologic: approx 1cm diameter mosaic area of verrucous lesions to plantar medial L heel area.   No paronychia or evidence of soft tissue infection is noted.     Vascular: DP & PT pulses are intact & regular on the L.  No significant edema or varicosities noted.  CFT and skin temperature are normal to both lower extremities.     Neurologic: Lower extremity sensation is intact to light touch.  No evidence of weakness or contracture in the lower extremities.  No evidence of neuropathy.     Musculoskeletal: Patient is ambulatory without assistive device or brace.  No gross ankle deformity noted.  No foot or ankle joint effusion is noted.     ASSESSMENT: L heel plantar warts     PLAN:  Reviewed patient's chart in epic.  Discussed condition and treatment options including pros and cons.    Discussed wart treatment options. Warts are caused by a virus. There is no cure, only treatment. Treatment options include freezing, cantharidin, occlusion, home treatments, prescription topicals.  Also discussed Derm referral.    Pt elected to try Rx topical, as she feels she has tried other treatments w/o improvement.  Efudex prescribed.  Pt will f/u with her Dermatologist if not improving.    F/u with me as " needed.    Enrique Bae, DEENA, FACFAS

## 2020-08-11 NOTE — PATIENT INSTRUCTIONS
Thank you for choosing [facility] Podiatry / Foot & Ankle Surgery!    DR. VELASCO'S CLINIC LOCATIONS:     [facility]   [address]   [address]   [place] [address] [place] [address] [phone]  [phone]       [facility]: [phone]   [address] APPOINTMENTS: [phone]   [place] [address] BILLING Q's: [phone] TRIAGE RN:  [phone]       Follow up:  As needed  Diagnosis:  Plantar Wart  Next steps: Fill rx, call with any questions.    Please read through the following handouts and if you have any questions, please feel free to call us or send a [employer] message!        .PLANTAR WARTS   Plantar warts are a viral skin infection. As with most viral infections, there is no cure, only treatment. The virus is also quite superficial, so the immune system cannot recognize it as a problem. Therefore, treatment is aimed at causing an insult that the immune system can recognize. Typically plantar warts are treated by applying liquid nitrogen, to cause a local frostbite injury, or a strong acid, to cause a blister. Sometimes medications or creams that boost immune response are prescribed.   If your treatment was with the strong acid, you will likely develop a very tender, brown fluid-filled blister. This is normal and the blister should be allowed to break on its own and dry out.  Once it is dried out, use a pumice stone to trim away the dry skin and use an over-the-counter salicylic acid product in between appointments. Call the clinic if you have any concerns regarding your blister.   The regimen between office visits should include: daily trimming with the pumice stone, application of an over the counter wart product if available, and dressing with a small band-aid or piece of duct tape.     Duct tape is a non invasive treatment to warts.  Usually requires reapplying it every night.  It helps to  "\"choke out\" the wart.  Trimming the wart down with a razor first may also help.     Again, because there is no cure for warts, you may need several visits depending on how your wart responds. Please call the clinic if you have questions or concerns.             BODY WEIGHT AND YOUR FEET  The following information is included in the after visit summary for all patients. Body weight can be a sensitive issue to discuss in clinic, but we think the following information is very important. Although we focus on the feet and ankles, we do support the overall health of our patients. Many things can cause foot and ankle problems. Foot structure, activity level, foot mechanics and injuries are common causes of pain. One very important issue that often goes unmentioned, is body weight. Extra weight can cause increased stress on muscles, ligaments, bones and tendons. Sometimes just a few extra pounds is all it takes to put one over her/his threshold. Without reducing that stress, it can be difficult to alleviate pain. As Foot & Ankle specialists, our job is addressing the lower extremity problem and possible causes. Regarding extra body weight, we encourage patients to discuss diet and weight management plans with their primary care doctors. It is this team approach that gives you the best opportunity for pain relief and getting you back on your feet. [facility] has a Comprehensive Weight Management Program. This program includes counseling, education, non-surgical and surgical approaches to weight loss. If you are interested in learning more either talk to you primary care provider or call [phone].      "

## 2020-08-13 ENCOUNTER — TELEPHONE (OUTPATIENT)
Dept: PODIATRY | Facility: CLINIC | Age: 30
End: 2020-08-13

## 2020-08-13 DIAGNOSIS — B07.0 PLANTAR WART: Primary | ICD-10-CM

## 2020-08-13 RX ORDER — FLUOROURACIL 50 MG/G
CREAM TOPICAL
Qty: 40 G | Refills: 0 | Status: SHIPPED | OUTPATIENT
Start: 2020-08-13 | End: 2020-08-19

## 2020-08-13 NOTE — TELEPHONE ENCOUNTER
Reason for Call:  Medication or medication refill:    Do you use a [facility]?  Name of the pharmacy and phone number for the current request:     [facility] [place], [place] - [address]    Name of the medication requested: fluorouracil (EFUDEX) 5 % external cream     Other request: Pt called stating it is too expensive at a different pharma and accidentally left her paper Rx at the pharmacy. Is hoping for us to send an electronic Rx to the pharma above    Can we leave a detailed message on this number? YES    Phone number patient can be reached at: Cell number on file:    Telephone Information:   Mobile [phone]     Best Time: any    Call taken on 8/13/2020 at 8:47 AM by Shefali Bernabe

## 2020-08-13 NOTE — TELEPHONE ENCOUNTER
Routed to Dr. Bae to advise.    Genaro Rasmussen, [facility]  Podiatry/Foot & Ankle Surgery  [facility]

## 2020-08-13 NOTE — TELEPHONE ENCOUNTER
Called patient and left a voicemail tthat Rx was sent to [facility] pharmacy.    Genaro Rasmussen CMA (AAMA)  Podiatry/Foot & Ankle Surgery  [facility]

## 2020-08-19 RX ORDER — FLUOROURACIL 50 MG/G
CREAM TOPICAL
Qty: 40 G | Refills: 0 | Status: SHIPPED | OUTPATIENT
Start: 2020-08-19 | End: 2024-01-29

## 2020-08-19 NOTE — TELEPHONE ENCOUNTER
Rx resent.  Evidently the text instructions were too long on the prior Rx so it defaulted to print instead of e-prescribe.    Please confirm receipt by pharmacy and inform pt.

## 2020-08-19 NOTE — TELEPHONE ENCOUNTER
Reason for call:  BRIANNE from Laura regarding Rx. Had to change Pharmacy to [facility], called and they say they dont have the script. Please call to confirm sent.    Phone number to reach patient:  Home number on file [phone] (home)    Best Time:  any    Can we leave a detailed message on this number?  NO

## 2020-10-20 ENCOUNTER — OFFICE VISIT (OUTPATIENT)
Dept: FAMILY MEDICINE | Facility: CLINIC | Age: 30
End: 2020-10-20
Payer: COMMERCIAL

## 2020-10-20 VITALS
SYSTOLIC BLOOD PRESSURE: 112 MMHG | TEMPERATURE: 97.9 F | RESPIRATION RATE: 16 BRPM | HEART RATE: 64 BPM | BODY MASS INDEX: 22.34 KG/M2 | OXYGEN SATURATION: 99 % | DIASTOLIC BLOOD PRESSURE: 70 MMHG | WEIGHT: 139 LBS | HEIGHT: 66 IN

## 2020-10-20 DIAGNOSIS — Z12.4 SCREENING FOR CERVICAL CANCER: ICD-10-CM

## 2020-10-20 DIAGNOSIS — Z00.00 ROUTINE GENERAL MEDICAL EXAMINATION AT A HEALTH CARE FACILITY: Primary | ICD-10-CM

## 2020-10-20 DIAGNOSIS — Z97.5 IUD (INTRAUTERINE DEVICE) IN PLACE: ICD-10-CM

## 2020-10-20 PROCEDURE — G0145 SCR C/V CYTO,THINLAYER,RESCR: HCPCS | Performed by: NURSE PRACTITIONER

## 2020-10-20 PROCEDURE — 87624 HPV HI-RISK TYP POOLED RSLT: CPT | Performed by: NURSE PRACTITIONER

## 2020-10-20 PROCEDURE — 90686 IIV4 VACC NO PRSV 0.5 ML IM: CPT | Performed by: NURSE PRACTITIONER

## 2020-10-20 PROCEDURE — 90471 IMMUNIZATION ADMIN: CPT | Performed by: NURSE PRACTITIONER

## 2020-10-20 PROCEDURE — 99395 PREV VISIT EST AGE 18-39: CPT | Mod: 25 | Performed by: NURSE PRACTITIONER

## 2020-10-20 ASSESSMENT — ENCOUNTER SYMPTOMS
HEMATURIA: 0
CHILLS: 0
DIZZINESS: 0
DIARRHEA: 0
ABDOMINAL PAIN: 0
CONSTIPATION: 0
COUGH: 0
EYE PAIN: 0
HEMATOCHEZIA: 0
NERVOUS/ANXIOUS: 1

## 2020-10-20 ASSESSMENT — MIFFLIN-ST. JEOR: SCORE: 1362.25

## 2020-10-20 NOTE — LETTER
October 27, 2020      Laura Katrin  [address] [address]        Dear MsLizKatrin,    We are happy to inform you that your recent Pap smear and Human Papillomavirus (HPV) test results are normal and negative.    It is recommended that you have your next Pap smear and Human Papillomavirus (HPV) test in 5 years. You will also need to return to the clinic every year for an annual wellness visit.    If you have additional questions regarding this result, please contact our office and we will be happy to assist you.      Sincerely,    Your [facility]

## 2020-10-20 NOTE — PROGRESS NOTES
SUBJECTIVE:   CC: Laura Wilkes is an 29 year old woman who presents for preventive health visit.       Healthy Habits:     Getting at least 3 servings of Calcium per day:  Yes    Bi-annual eye exam:  NO    Dental care twice a year:  Yes    Sleep apnea or symptoms of sleep apnea:  None    Diet:  Regular (no restrictions)    Frequency of exercise:  6-7 days/week    Duration of exercise:  45-60 minutes    Taking medications regularly:  Yes    Medication side effects:  None    PHQ-2 Total Score: 2    Additional concerns today:  Yes    Today's PHQ-2 Score:   PHQ-2 ( 1999 [employer]) 10/20/2020   Q1: Little interest or pleasure in doing things 1   Q2: Feeling down, depressed or hopeless 1   PHQ-2 Score 2   Q1: Little interest or pleasure in doing things Several days   Q2: Feeling down, depressed or hopeless Several days   PHQ-2 Score 2     Abuse: Current or Past (Physical, Sexual or Emotional) - No  Do you feel safe in your environment? Yes    Social History     Tobacco Use     Smoking status: Never Smoker     Smokeless tobacco: Never Used   Substance Use Topics     Alcohol use: No     Alcohol/week: 0.0 standard drinks     Comment: 3-4 drinks week     If you drink alcohol do you typically have >3 drinks per day or >7 drinks per week? No    Alcohol Use 10/20/2020   Prescreen: >3 drinks/day or >7 drinks/week? No   Prescreen: >3 drinks/day or >7 drinks/week? -   No flowsheet data found.    Reviewed orders with patient.  Reviewed health maintenance and updated orders accordingly - Yes  Lab work is in process  Labs reviewed in EPIC  BP Readings from Last 3 Encounters:   10/20/20 112/70   08/11/20 126/78   12/05/19 112/74    Wt Readings from Last 3 Encounters:   10/20/20 63 kg (139 lb)   08/11/20 61.2 kg (135 lb)   12/05/19 65.3 kg (144 lb)                  Patient Active Problem List   Diagnosis     Family history of malignant melanoma of skin     Asthma, exercise induced     Left knee pain     Acute appendicitis      Past Surgical History:   Procedure Laterality Date     APPENDECTOMY N/A      LAPAROSCOPIC APPENDECTOMY N/A 3/27/2017    Procedure: LAPAROSCOPIC APPENDECTOMY;  Surgeon: Cathi Nunez MD;  Location:  OR       Social History     Tobacco Use     Smoking status: Never Smoker     Smokeless tobacco: Never Used   Substance Use Topics     Alcohol use: No     Alcohol/week: 0.0 standard drinks     Comment: 3-4 drinks week     Family History   Problem Relation Age of Onset     Melanoma Mother 52         Current Outpatient Medications   Medication Sig Dispense Refill     fluorouracil (EFUDEX) 5 % external cream Gently file warts. Apply a small amount of cream to the warts. Cover with duct tape or bandage. Perform 2x daily as needed for 6-10 weeks. 40 g 0     albuterol (ALBUTEROL) 108 (90 BASE) MCG/ACT Inhaler Inhale 1-2 puffs into the lungs every 6 hours as needed for shortness of breath / dyspnea (Patient not taking: Reported on 1/24/2019) 1 Inhaler 6     FLOVENT  MCG/ACT Inhaler INHALE 2 PUFFS BY MOUTH TWICE DAILY (Patient not taking: Reported on 7/23/2018) 12 g 1     triamcinolone (KENALOG) 0.1 % cream Apply to affected areas on legs, arms or trunk bid-tid for 10-14 consecutive days, not to be used on face or groin (Patient not taking: Reported on 1/24/2019) 80 g 0     No Known Allergies  Recent Labs   Lab Test 03/27/17  1154 03/27/17  1026   ALT 51* 52*   CR 0.85 0.82   GFRESTIMATED 79 82   GFRESTBLACK >90   GFR Calc   >90   GFR Calc     POTASSIUM 3.7 4.1        Mammogram not appropriate for this patient based on age.    Pertinent mammograms are reviewed under the imaging tab.  History of abnormal Pap smear:   Last 3 Pap and HPV Results:   PAP / HPV 8/21/2017 3/29/2013   PAP NIL NIL     PAP / HPV 8/21/2017 3/29/2013   PAP NIL NIL     Reviewed and updated as needed this visit by clinical staff  Tobacco  Allergies  Meds   Med Hx  Surg Hx  Fam Hx  Soc Hx     "    Reviewed and updated as needed this visit by Provider                  Mirena IUD:  Placed a few years ago.  \"I think it is time to be removed.\"  She is considering a pregnancy in the next year.  She would like to consider the depo but she is open to suggestions.        Review of Systems   Constitutional: Negative for chills.   HENT: Negative for congestion and ear pain.    Eyes: Negative for pain.   Respiratory: Negative for cough.    Cardiovascular: Negative for chest pain.   Gastrointestinal: Negative for abdominal pain, constipation, diarrhea and hematochezia.   Genitourinary: Negative for hematuria.   Neurological: Negative for dizziness.   Psychiatric/Behavioral: The patient is nervous/anxious.         OBJECTIVE:   /70 (BP Location: Right arm, Patient Position: Sitting, Cuff Size: Adult Regular)   Pulse 64   Temp 97.9  F (36.6  C) (Temporal)   Resp 16   Ht 1.676 m (5' 6\")   Wt 63 kg (139 lb)   LMP 10/05/2020 (Approximate)   SpO2 99%   BMI 22.44 kg/m    Physical Exam  GENERAL: healthy, alert and no distress  EYES: Eyes grossly normal to inspection, PERRL and conjunctivae and sclerae normal  HENT: ear canals and TM's normal, nose and mouth without ulcers or lesions  NECK: no adenopathy, no asymmetry, masses, or scars and thyroid normal to palpation  RESP: lungs clear to auscultation - no rales, rhonchi or wheezes  BREAST: normal without masses, tenderness or nipple discharge and no palpable axillary masses or adenopathy  CV: regular rate and rhythm, normal S1 S2, no S3 or S4, no murmur, click or rub, no peripheral edema and peripheral pulses strong  ABDOMEN: soft, nontender, no hepatosplenomegaly, no masses and bowel sounds normal   (female): normal female external genitalia, normal urethral meatus, vaginal mucosa pink, moist, well rugated, and normal cervix/adnexa/uterus without masses or discharge. IUD strings not visible today.   MS: no gross musculoskeletal defects noted, no edema  SKIN: " "no suspicious lesions or rashes  NEURO: Normal strength and tone, mentation intact and speech normal  PSYCH: mentation appears normal, affect normal/bright    Diagnostic Test Results:  Labs reviewed in Epic      ASSESSMENT/PLAN:     (Z00.00) Routine general medical examination at a health care facility  (primary encounter diagnosis)  Comment:   Plan: Flu shot given today    (Z97.5) IUD (intrauterine device) in place, Mirena placed 10/9/2017  Comment:   Plan: for today, I reassured the patient that her Mirena being inserted in 2017, is safe to stay on for a full 5+ years.  She was happy with this.  She was not excited about either the Depo or birth control pill options.  She will keep the IUD in for now.  I did tell her that when it is time to have the IUD removed, do follow-up and be seen by OB/GYN.  Today the IUD strings were not visible.  I did encourage her to have the IUD removed at least 3 months before she wants to get pregnant.  She is appreciative and will leave the IUD in for now.    (Z12.4) Screening for cervical cancer  Comment: Routine  Plan: Pap imaged thin layer screen with HPV -         recommended age 30 - 65, HPV High Risk Types         DNA Cervical        Done today        Patient has been advised of split billing requirements and indicates understanding: Yes  COUNSELING:  Reviewed preventive health counseling, as reflected in patient instructions    Estimated body mass index is 22.44 kg/m  as calculated from the following:    Height as of this encounter: 1.676 m (5' 6\").    Weight as of this encounter: 63 kg (139 lb).        She reports that she has never smoked. She has never used smokeless tobacco.      Counseling Resources:  ATP IV Guidelines  Pooled Cohorts Equation Calculator  Breast Cancer Risk Calculator  BRCA-Related Cancer Risk Assessment: FHS-7 Tool  FRAX Risk Assessment  ICSI Preventive Guidelines  Dietary Guidelines for Americans, 2010  USDA's MyPlate  ASA Prophylaxis  Lung CA " Screening    SIGRID Narayan CNP  [facility]

## 2020-10-21 ASSESSMENT — ASTHMA QUESTIONNAIRES: ACT_TOTALSCORE: 25

## 2020-10-22 LAB
COPATH REPORT: NORMAL
PAP: NORMAL

## 2020-10-23 LAB
FINAL DIAGNOSIS: NORMAL
HPV HR 12 DNA CVX QL NAA+PROBE: NEGATIVE
HPV16 DNA SPEC QL NAA+PROBE: NEGATIVE
HPV18 DNA SPEC QL NAA+PROBE: NEGATIVE
SPECIMEN DESCRIPTION: NORMAL
SPECIMEN SOURCE CVX/VAG CYTO: NORMAL

## 2021-01-15 ENCOUNTER — OFFICE VISIT (OUTPATIENT)
Dept: OBGYN | Facility: CLINIC | Age: 31
End: 2021-01-15
Payer: COMMERCIAL

## 2021-01-15 VITALS
OXYGEN SATURATION: 98 % | HEART RATE: 71 BPM | SYSTOLIC BLOOD PRESSURE: 117 MMHG | WEIGHT: 142 LBS | DIASTOLIC BLOOD PRESSURE: 85 MMHG | BODY MASS INDEX: 22.92 KG/M2

## 2021-01-15 DIAGNOSIS — Z30.430 ENCOUNTER FOR IUD INSERTION: Primary | ICD-10-CM

## 2021-01-15 DIAGNOSIS — Z30.430 ENCOUNTER FOR INSERTION OF INTRAUTERINE CONTRACEPTIVE DEVICE: ICD-10-CM

## 2021-01-15 LAB — HCG UR QL: NEGATIVE

## 2021-01-15 PROCEDURE — 81025 URINE PREGNANCY TEST: CPT | Performed by: OBSTETRICS & GYNECOLOGY

## 2021-01-15 PROCEDURE — 58300 INSERT INTRAUTERINE DEVICE: CPT | Performed by: OBSTETRICS & GYNECOLOGY

## 2021-01-15 NOTE — PROGRESS NOTES
[facility]- OBGYN    CC: IUD insertion    S:Laura Wilkes is a 30 year old  who presents today for IUD insertion.  Patient reports she is currently on her menses and uses a menstrual cup.  She removed it yesterday and did not release the suction prior to removal.  With remove her IUD expelled.  She did take a photo, which she showed in clinic and demonstrates and intact Mirena IUD with strings and bilateral arms present.  Patient would like reinsertion of Mirena IUD today.    OBGYN Hx:     TAB with D&C x1  Patient's last menstrual period was 2021.  Menses: regular, lasting 10-14 days but not heavy  STD Hx:none, declines testing today  Pap hx: 10/20/20 NIL HPV negative    PMH: asthma  PSH:laparoscpoic appendectomy  Meds: none  Allergies:NKDA    O:   Patient Vitals for the past 24 hrs:   BP Pulse SpO2 Weight   01/15/21 1031 117/85 71 98 % 64.4 kg (142 lb)   ]  Exam:  General- awake, alert, answering questions appropriately, appears comfortable  CV- regular rate  Lung- breathing comfortably on room air  - normal appearing external genitalia, normal appearing vaginal mucosa, normal appearing cervix, moderate amount of thin yellow/white vaginal discharge    Imaging and Labs: UPT negative    A&P: Laura Wilkes is a 30 year old  who presents today for IUD insertion.    (Z30.430) Encounter for IUD insertion  (primary encounter diagnosis)  Comment: See below procedure.  Confirmed Mirena IUD out intact based on patient provided photo prior to insertion.  Plan: HCG Qual, Urine ([id number]), levonorgestrel         (MIRENA) 20 MCG/24HR IUD, levonorgestrel         (MIRENA) 20 MCG/24HR IUD 20 mcg, INSERTION         INTRAUTERINE DEVICE      IUD Insertion:  CONSULT:    Is a pregnancy test required: Yes.  Was it positive or negative?  Negative  Was a consent obtained?  Yes    Subjective: Laura Wilkes is a 30 year old  presents for IUD and desires Mirena type  IUD.    Patient has been given the opportunity to ask questions about all forms of birth control, including all options appropriate for Laura Wilkes. Discussed that no method of birth control, except abstinence is 100% effective against pregnancy or sexually transmitted infection.     Laura Wilkes understands she may have the IUD removed at any time. IUD should be removed by a health care provider.    The entire insertion procedure was reviewed with the patient, including care after placement.    Patient's last menstrual period was 01/06/2021.  No allergy to betadine or shellfish. Patient declines STD screening  HCG Qual Urine   Date Value Ref Range Status   01/15/2021 Negative NEG^Negative Final     Comment:     This test is for screening purposes.  Results should be interpreted along with   the clinical picture.  Confirmation testing is available if warranted by   ordering [id number], HCG Quantitative Pregnancy.         PROCEDURE NOTE: -- IUD Insertion    Reason for Insertion: contraception    Under sterile technique, cervix was visualized with speculum and prepped with Betadine solution swab x 3. Tenaculum was placed for stability. The uterus was gently straightened and sounded to 7.0 cm using endometrial pipelle. IUD prepared for placement, and IUD inserted according to 's instructions without difficulty or significant resitance, and deployed at the fundus. The strings were visualized and trimmed to 3.0 cm from the external os. Tenaculum was removed and hemostasis noted. Speculum removed.  Patient tolerated procedure well.    Lot # [id number]  Exp: 3/2023    EBL: minimal    Complications: none      Given 's handouts, including when to have IUD removed, list of danger s/sx, side effects and follow up recommended. Encouraged condom use for prevention of STD. Back up contraception advised for 7 days if progestin method. Advised to call for any fever, for prolonged or severe pain  or bleeding, abnormal vaginal discharge, or unable to palpate strings. She was advised to use pain medications (ibuprofen) as needed for mild to moderate pain. Advised to follow-up in clinic in 4-6 weeks for IUD string check if unable to find strings or as directed by provider.     Jaquelin Smart MD

## 2021-03-19 NOTE — TELEPHONE ENCOUNTER
Chantal presents for evaluation today regarding hearing loss.  She is otherwise doing fairly well but has had progressive decline in hearing and is starting to struggle in conversations.  She has no ear pain or discomfort.  She denies any fever chills or infectious symptoms.  She has no vertigo or disequilibrium.  She is sent for this evaluation by Dr. Ric Head.    I have reviewed the patient's medications and allergies, past medical, surgical, social and family history, updating these as appropriate.  See Histories section of the EMR for a display of this information.  Review of systems were completed and no other ear, nose and throat related symptoms or problems were detected.    Physical Examination:  Chantal is a 78 year old female in no acute distress.  Skin color and turgor are normal with no obvious lesions.  The patient is well-developed and appears well-nourished.  The patient is oriented x3 without obvious neurological abnormality.  Cranial nerves II through XII are intact without deficit.  External ears are normal without lesion.  Ear canals reveal wax on her right side.  Using multiple microscopic instruments, excellent effort, and adequate time her cerumen impaction is removed.  Her left ear canal has no wax or debris.  Tympanic membranes are carefully examined and there are normal bilaterally.  External nose is normal without lesion.  Facial exam shows no weakness or tenderness.     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  Her right ear reveals a moderate sensorineural hearing loss with a relatively flat profile and 90% discrimination.    Assessment:  Cerumen impaction right ear now resolved  Sensorineural hearing loss    Plan:  She will now see and talk with Dr. Pedersen further regarding a hearing aid.  We will see her back in the future on a p.r.n. basis.       Per Dr. Weldon- d/c current treatment and start Fluconazole as directed.  Left message on answering machine for patient with the below instructions.  Lashawn Davison RN  [facility]  [phone]

## 2022-04-26 ENCOUNTER — OFFICE VISIT (OUTPATIENT)
Dept: URGENT CARE | Facility: URGENT CARE | Age: 32
End: 2022-04-26
Payer: COMMERCIAL

## 2022-04-26 VITALS
DIASTOLIC BLOOD PRESSURE: 68 MMHG | BODY MASS INDEX: 21.69 KG/M2 | TEMPERATURE: 98.1 F | HEART RATE: 66 BPM | HEIGHT: 66 IN | OXYGEN SATURATION: 98 % | SYSTOLIC BLOOD PRESSURE: 103 MMHG | WEIGHT: 135 LBS

## 2022-04-26 DIAGNOSIS — R05.3 PERSISTENT COUGH: Primary | ICD-10-CM

## 2022-04-26 DIAGNOSIS — R09.82 POST-NASAL DRIP: ICD-10-CM

## 2022-04-26 DIAGNOSIS — R09.89 TICKLE IN THROAT: ICD-10-CM

## 2022-04-26 PROCEDURE — 99213 OFFICE O/P EST LOW 20 MIN: CPT | Performed by: FAMILY MEDICINE

## 2022-04-26 RX ORDER — AZITHROMYCIN 250 MG/1
TABLET, FILM COATED ORAL
Qty: 6 TABLET | Refills: 0 | Status: SHIPPED | OUTPATIENT
Start: 2022-04-26 | End: 2022-05-01

## 2022-04-26 RX ORDER — CETIRIZINE HYDROCHLORIDE 10 MG/1
10 TABLET ORAL DAILY
Qty: 30 TABLET | Refills: 0 | Status: SHIPPED | OUTPATIENT
Start: 2022-04-26 | End: 2024-01-29

## 2022-04-26 NOTE — PROGRESS NOTES
Chief Complaint   Patient presents with     Urgent Care     Cough     Pt in clinic c/o cough lasting 5 weeks.       ASSESMENT AND PLAN   Laura was seen today for urgent care and cough.    Diagnoses and all orders for this visit:    Persistent cough  -     XR Chest 2 Views  -     cetirizine (ZYRTEC) 10 MG tablet; Take 1 tablet (10 mg) by mouth daily  -     azithromycin (ZITHROMAX) 250 MG tablet; Take 2 tablets (500 mg) by mouth daily for 1 day, THEN 1 tablet (250 mg) daily for 4 days.    Post-nasal drip    Tickle in throat          Fluids, Rest, Saline gargles, Saline nasal spray and Vaporizer      Initial differential diagnosis included but was not restricted to   Differential Diagnosis:  URI Adult/Peds:  Asthma, Bronchitis-viral, Influenza, Pneumonia, Viral pharyngitis, Viral syndrome and Viral upper respiratory illness, whooping cough, postnasal drip  Medical Decision Making:  As cough has been going on for last 5 weeks would consider getting an x-ray.  As there is no fever and chills I do not think CBC is needed at this point.  X-ray did not show any acute abnormality discussed with patient about the x-ray finding  As cough has been going on for 5 weeks and sometimes is productive and also is not getting better I am concerned about possible bronchitis/could also be whooping cough also so would consider treating with a short course of azithromycin and see if symptoms get better I also reviewed with patient that symptoms could also be from postnasal drip so doing a trial with antihistamine might be a good idea     routine discharge counseling was given to the patient and the patient understands that worsening, changing or persistent symptoms should prompt an immediate call or follow up with their primary physician or the emergency department. The importance of appropriate follow up was also discussed with the patient.     I have reviewed the nursing notes.  Review of the result(s) of each unique test -   X-Ray  was done, my findings are: no acute infiltrate noted     Time  spent on the date of the encounter doing chart review, review of test results, interpretation of tests, patient visit and documentation   see orders in Epic  Pt verbalized and agreed with the plan and is aware of the worsening symptoms for which would need to follow up .  Pt was stable during time of discharge from the clinic     SUBJECTIVE     Laura Wileks is a 31 year old female presenting with a chief complaint of    Chief Complaint   Patient presents with     Urgent Care     Cough     Pt in clinic c/o cough lasting 5 weeks.           Laura Wilkes is a 31 year old female with h/o asthma presenting with a chief complaint of cough - non-productive. She is an established patient of [place].  Onset of symptoms was 5 week(s) ago.  Course of illness is worsening.    Severity moderate  Current and Associated symptoms: cough - non-productive and cough - productive  Treatment measures tried include None tried.  Predisposing factors include recent illness .    Past Medical History:   Diagnosis Date     Asthma exacerbation, mild 1995     Family history of melanoma      Current Outpatient Medications   Medication Sig Dispense Refill     azithromycin (ZITHROMAX) 250 MG tablet Take 2 tablets (500 mg) by mouth daily for 1 day, THEN 1 tablet (250 mg) daily for 4 days. 6 tablet 0     cetirizine (ZYRTEC) 10 MG tablet Take 1 tablet (10 mg) by mouth daily 30 tablet 0     levonorgestrel (MIRENA) 20 MCG/24HR IUD 1 each (20 mcg) by Intrauterine route once       albuterol (ALBUTEROL) 108 (90 BASE) MCG/ACT Inhaler Inhale 1-2 puffs into the lungs every 6 hours as needed for shortness of breath / dyspnea (Patient not taking: No sig reported) 1 Inhaler 6     FLOVENT  MCG/ACT Inhaler INHALE 2 PUFFS BY MOUTH TWICE DAILY (Patient not taking: No sig reported) 12 g 1     fluorouracil (EFUDEX) 5 % external cream Gently file warts. Apply a small amount of  "cream to the warts. Cover with duct tape or bandage. Perform 2x daily as needed for 6-10 weeks. (Patient not taking: Reported on 4/26/2022) 40 g 0     triamcinolone (KENALOG) 0.1 % cream Apply to affected areas on legs, arms or trunk bid-tid for 10-14 consecutive days, not to be used on face or groin (Patient not taking: No sig reported) 80 g 0     Social History     Tobacco Use     Smoking status: Never Smoker     Smokeless tobacco: Never Used   Substance Use Topics     Alcohol use: No     Alcohol/week: 0.0 standard drinks     Comment: 3-4 drinks week     Family History   Problem Relation Age of Onset     Melanoma Mother 52         ROS:    10 point ROS of systems including Constitutional, Eyes, Cardiovascular, Gastroenterology, Genitourinary, Integumentary, Muscularskeletal, Psychiatric ,neurological were all negative except for pertinent positives noted in my HPI         OBJECTIVE:    /68   Pulse 66   Temp 98.1  F (36.7  C) (Temporal)   Ht 1.676 m (5' 6\")   Wt 61.2 kg (135 lb)   SpO2 98%   BMI 21.79 kg/m    GENERAL APPEARANCE: healthy, alert and no distress  EYES: EOMI,  PERRL, conjunctiva clear  HENT: ear canals and TM's normal.  Nose and mouth without ulcers, erythema or lesions  NECK: supple, nontender, no lymphadenopathy  RESP: lungs clear to auscultation - no rales, rhonchi or wheezes  CV: regular rates and rhythm, normal S1 S2, no murmur noted  PSYCH: mentation appears normal  Physical Exam      (Note was completed, in part, with [employer] voice-recognition software. Documentation reviewed, but some grammatical, spelling, and word errors may remain.)  Charmaine Fernandez MD.                  "

## 2023-12-11 NOTE — MR AVS SNAPSHOT
After Visit Summary   1/31/2017    Laura Wilkes    MRN: [id number]           Patient Information     Date Of Birth          1990        Visit Information        Provider Department      1/31/2017 9:00 AM Dea Broussard MD [facility]        Today's Diagnoses     Tinea corporis    -  1     Rash         Family history of melanoma            Follow-ups after your visit        Additional Services     DERMATOLOGY REFERRAL       Your provider has referred you to: N: [facility]  [place] [phone]   [email or web address]    Please be aware that coverage of these services is subject to the terms and limitations of your health insurance plan.  Call member services at your health plan with any benefit or coverage questions.      Please bring the following with you to your appointment:    (1) Any X-Rays, CTs or MRIs which have been performed.  Contact the facility where they were done to arrange for  prior to your scheduled appointment.    (2) List of current medications  (3) This referral request   (4) Any documents/labs given to you for this referral                  Who to contact     If you have questions or need follow up information about today's clinic visit or your schedule please contact [facility] directly at [phone].  Normal or non-critical lab and imaging results will be communicated to you by MyChart, letter or phone within 4 business days after the clinic has received the results. If you do not hear from us within 7 days, please contact the clinic through MyChart or phone. If you have a critical or abnormal lab result, we will notify you by phone as soon as possible.  Submit refill requests through [employer] or call your pharmacy and they will forward the refill request to us. Please allow 3 business days for your refill to be completed.          Additional Information About Your Visit        MyChart Information     [employer]  Consults    Reason For Consult  Diverticulitis with abscess from Dr Santiago     Location [facility]     History Of Present Illness  Tom Treviño is a 53 y.o. male on day 0 of admission presenting with Diverticulitis.    We saw the patient recently on 11/28/2023 for diverticulitis with abscess at that time, we consulted IR who determined that the abscess was improved and did not recommend drainage at that time.  The patient was discharged 11/30 on a 12-day regimen of Augmentin that he has been taking as directed except for he did not take last night's dose, stating he still has 2 more days left.    Says seemed to be getting better till this past Saturday when his left lower quadrant abdominal pain started getting worse gradually, progressively worse since then, constant, felt more worse after eating no matter what he ate even though he was still eating light from when he was discharged before, described as crampy, upon ER presentation was a 5-6 and is currently a 0.  No nausea or vomiting.  Has not had an appetite but currently feels hungry.  Passing gas.  Last bowel movement was a little bit ago and was just a little bit and kind of loose without blood.  No symptoms from his umbilical hernia.    Dos not take any anticoagulants at home.  Please see his below past medical and past surgical history.  Please see his labs and imaging that are noted.    Past Medical History  Diverticulitis  GERD  Hypertension  Obesity  Umbilical hernia    Surgical History  Open appendectomy  2/17/2015      Social History  Comes from home with his wife and son  Works in construction  Never smoker  No drugs  Alcohol wise says has couple per day      Family History  Denies any family history of diverticulitis or colon cancer        Allergies  Allergies   Allergen Reactions    Esomeprazole Magnesium Other     Headaches        Review of Systems  1) Anesthetic complications: No known personal or family history of anesthetic  "complications  2) General: No significant unintentional weight loss or gain, fevers, chills, weakness, fatigue.  Positive appetite loss.  3) HEENT: Negative.  4) Cardiac: No angina or leg edema.  5) Pulmonary: No asthma, wheezing, sob.  6) GI: As per HPI.  7) Hematologic: No known personal or family history of bleeding diathesis.  8) Endocrine: No diabetes or thyroid disorders.  9) : No dysuria, hematuria, or frequency.  10) Musculoskeletal: No muscle/bone/joint pain/stiffness/swelling.  11) Neuro: No history of seizures or strokes.  12) Psych: No anxiety or depression      Physical Exam  1) VS-noted as documented.  2) General-laying in bed in no acute distress. Not septic appearing. Pleasant and cooperative. Looks fine and comfortable.   3) Neuro-Awake, alert, oriented to person, place, and time. Speech is normal. Affect is normal. Follows commands appropriately.  4) HEENT-Head normocephalic and externally atraumatic. Conjunctiva pink. Sclera anicteric. MMM.  5) Heart-RRR.   6) Lungs-Clear. Speaks in complete sentences and does not have any accessory muscle use.  7) Extremities-No edema. The patient's extremities are warm and normal color.  8) Skin-Warm and dry. No diaphoresis or jaundice.  9) Psych-Normal mood. Appropriate affect.   10) Abdomen-Soft. Positive bowel sounds. Non distended.  Nontender except for only left lower quadrant the patient rates a 1-2 with no guarding or rebound.  It does not cause the patient any abdominal pain when I shake his bed a little bit.  Small benign umbilical hernia.   Dr Higgins came to bedside    Vital signs in last 24 hours:  Temp:  [36.5 °C (97.7 °F)-36.8 °C (98.2 °F)] 36.8 °C (98.2 °F)  Heart Rate:  [61-79] 70  Resp:  [16] 16  BP: (121-144)/(68-80) 121/70  Heart Rate:  [61-79]   Temp:  [36.5 °C (97.7 °F)-36.8 °C (98.2 °F)]   Resp:  [16]   BP: (121-144)/(68-80)   Height:  [188 cm (6' 2\")]   Weight:  [118 kg (259 lb 4.2 oz)]   SpO2:  [97 %-99 %]      Intake/Output last 3 " "lets you send messages to your doctor, view your test results, renew your prescriptions, schedule appointments and more. To sign up, go to [email or web address] . Click on \"Log in\" on the left side of the screen, which will take you to the Welcome page. Then click on \"Sign up Now\" on the right side of the page.     You will be asked to enter the access code listed below, as well as some personal information. Please follow the directions to create your username and password.     Your access code is: [email or web address]  Expires: 2017  9:57 AM     Your access code will  in 90 days. If you need help or a new code, please call your [place] clinic or [phone].        Care EveryWhere ID     This is your Care EveryWhere ID. This could be used by other organizations to access your [facility] medical records  [id number]        Your Vitals Were     Pulse Temperature Height BMI (Body Mass Index) Pulse Oximetry Last Period    81 97.7  F (36.5  C) (Oral) 5' 7.5\" (1.715 m) 21.34 kg/m2 100% 2017       Blood Pressure from Last 3 Encounters:   17 104/72   16 98/66   16 96/58    Weight from Last 3 Encounters:   17 138 lb 6.4 oz (62.778 kg)   16 133 lb 8 oz (60.555 kg)   16 134 lb (60.782 kg)              We Performed the Following     DERMATOLOGY REFERRAL     JAYSON prep (skin, hair or nails only)          Today's Medication Changes          These changes are accurate as of: 17  9:57 AM.  If you have any questions, ask your nurse or doctor.               Start taking these medicines.        Dose/Directions    ciclopirox 0.77 % cream   Commonly known as:  LOPROX   Used for:  Tinea corporis   Started by:  Dea Broussard MD        Apply topically 2 times daily   Quantity:  30 g   Refills:  1            Where to get your medicines      These medications were sent to [facility] [id number] [place] [address] - [address] [place] [address] & [address]  [address] [place] [address], " Shifts:  I/O last 3 completed shifts:  In: 1250 (10.6 mL/kg) [IV Piggyback:1250]  Out: - (0 mL/kg)   Weight: 117.6 kg     Scheduled medications  ciprofloxacin, 400 mg, intravenous, q12h  enoxaparin, 40 mg, subcutaneous, q24h  hydroCHLOROthiazide, 25 mg, oral, Daily  metroNIDAZOLE, 500 mg, intravenous, q8h  pantoprazole, 40 mg, oral, Daily before breakfast  polyethylene glycol, 17 g, oral, Daily      Continuous medications  potassium chloride in 0.9%NaCl, 100 mL/hr, Last Rate: 100 mL/hr (12/11/23 0635)      PRN medications  PRN medications: acetaminophen **OR** acetaminophen **OR** acetaminophen, ibuprofen, morphine, ondansetron ODT **OR** ondansetron    Relevant Results  Results from last 7 days   Lab Units 12/11/23  0424 12/10/23  2132   WBC AUTO x10*3/uL 11.0 12.4*   HEMOGLOBIN g/dL 14.9 15.7   HEMATOCRIT % 43.7 45.0   PLATELETS AUTO x10*3/uL 265 299      Results from last 7 days   Lab Units 12/11/23  0424 12/10/23  2132   SODIUM mmol/L 139 138   POTASSIUM mmol/L 3.6 3.3*   CHLORIDE mmol/L 103 99   CO2 mmol/L 27 25   BUN mg/dL 13 12   CREATININE mg/dL 1.20 1.00   GLUCOSE mg/dL 109* 107*   CALCIUM mg/dL 8.5 9.1      CT abdomen pelvis w IV contrast  Result Date: 12/10/2023  Interpreted By:  Richard Gilman, STUDY: CT ABDOMEN PELVIS W IV CONTRAST; 12/10/2023 10:14 pm   INDICATION: Abscess left side of abdomen, continued abdominal pain   COMPARISON: 11/28/2023   ACCESSION NUMBER(S): [id number]   ORDERING CLINICIAN: JASON OLIVAS   TECHNIQUE: Contiguous axial images of the abdomen/pelvis were performed with IV contrast. 75 ml of Omnipaque 350 was utilized. Coronal and sagittal reformatted images were also obtained. All CT examinations are performed with 1 or more of the following dose reduction techniques: Automated exposure control, adjustment of mA and/or kv according to patient's size, or use of iterative reconstruction techniques.     FINDINGS: The liver, gallbladder,  common bile duct, pancreas, spleen, and  [facility] [address]     Phone:  [phone]    - ciclopirox 0.77 % cream             Primary Care Provider Office Phone # Fax #    SIGRID Carbajal JASON [phone]       [facility] [address] [address]  [facility] [address]        Thank you!     Thank you for choosing [facility]  for your care. Our goal is always to provide you with excellent care. Hearing back from our patients is one way we can continue to improve our services. Please take a few minutes to complete the written survey that you may receive in the mail after your visit with us. Thank you!             Your Updated Medication List - Protect others around you: Learn how to safely use, store and throw away your medicines at www.disposemymeds.org.          This list is accurate as of: 1/31/17  9:57 AM.  Always use your most recent med list.                   Brand Name Dispense Instructions for use    albuterol 108 (90 BASE) MCG/ACT Inhaler    albuterol    1 Inhaler    Inhale 1-2 puffs into the lungs every 6 hours as needed for shortness of breath / dyspnea       ciclopirox 0.77 % cream    LOPROX    30 g    Apply topically 2 times daily          adrenal glands are unremarkable.   The kidneys enhance symmetrically. No urolithiasis is seen. No hydroureteronephrosis is seen.   The visualized aorta is unremarkable.   The small bowel is not dilated. Again seen is a pericolonic abscess in the mid sigmoid colon with diffuse circumferential wall thickening and serosal phlegmonous changes. There is a serosal based collection measuring 2.7 x 2.6 cm as well as smaller collections along the serosal surface. Overall the degree of phlegmonous changes stable to minimally worsened since the prior study.   The small abscess abuts the dome of the bladder without evidence of a fistulous tract.   No free intraperitoneal air or fluid is seen.   The bladder is well distended with no gross wall thickening.   The visualized osseous structures are intact.   Limited images of the lower thorax are unremarkable.       Again seen is a pericolonic abscess in the mid sigmoid colon with diffuse circumferential wall thickening and serosal phlegmonous changes. There is a serosal based collection measuring 2.7 x 2.6 cm as well as smaller collections along the serosal surface. Overall the degree of phlegmonous changes stable to minimally worsened since the prior study.   Signed by: Richard Gilman 12/10/2023 11:07 PM Dictation workstation:   [id number]         Assessment/Plan   Diverticulitis of intestine with abscesses  N.p.o.  IV fluids  Antibiotics per IM/ID-on IV Cipro and IV Flagyl  IR consulted for drainage  Morning labs ordered  Discussed with patient if he gets better without surgery then he needs to have a colonoscopy in 6 to 8 weeks down the road to make sure that this is only diverticulitis and not a perforated colon cancer  No acute surgical indications at present  Will follow     Upon discharge:   1-soft low fiber low residue diet for 2 weeks   2-avoid constipation:   -drink 8 glasses of water per day   -after 2 weeks take Metamucil daily   3-follow-up with your  gastroenterologist to have an elective colonoscopy in 6 to 8 weeks to make sure that this is only diverticulitis and not a perforated colon cancer   4-follow-up with a colorectal surgeon (Dr Ayse Persaud or Dr Paras Nunez) after has colonoscopy to have an elective colon resection   5-no nuts or seeds     Umbilical hernia  Benign  No acute surgical indications for at present     DVT prophylaxis  Per IM-on Lovenox      Mai Wells, APRN-CNP

## 2024-01-29 ENCOUNTER — OFFICE VISIT (OUTPATIENT)
Dept: INTERNAL MEDICINE | Facility: CLINIC | Age: 34
End: 2024-01-29
Payer: COMMERCIAL

## 2024-01-29 VITALS
HEART RATE: 65 BPM | SYSTOLIC BLOOD PRESSURE: 109 MMHG | BODY MASS INDEX: 20.85 KG/M2 | DIASTOLIC BLOOD PRESSURE: 73 MMHG | OXYGEN SATURATION: 98 % | RESPIRATION RATE: 16 BRPM | WEIGHT: 137.6 LBS | HEIGHT: 68 IN

## 2024-01-29 DIAGNOSIS — K58.9 IRRITABLE BOWEL SYNDROME WITHOUT DIARRHEA: ICD-10-CM

## 2024-01-29 DIAGNOSIS — Z11.59 NEED FOR HEPATITIS C SCREENING TEST: ICD-10-CM

## 2024-01-29 DIAGNOSIS — Z11.4 SCREENING FOR HIV (HUMAN IMMUNODEFICIENCY VIRUS): Primary | ICD-10-CM

## 2024-01-29 PROCEDURE — 99214 OFFICE O/P EST MOD 30 MIN: CPT | Performed by: INTERNAL MEDICINE

## 2024-01-29 RX ORDER — ESCITALOPRAM OXALATE 5 MG/1
TABLET ORAL
COMMUNITY
Start: 2024-01-15

## 2024-01-29 RX ORDER — DROSPIRENONE 4 MG/1
1 TABLET, FILM COATED ORAL
COMMUNITY
Start: 2023-10-15 | End: 2024-01-29

## 2024-01-29 ASSESSMENT — ASTHMA QUESTIONNAIRES
QUESTION_5 LAST FOUR WEEKS HOW WOULD YOU RATE YOUR ASTHMA CONTROL: COMPLETELY CONTROLLED
QUESTION_3 LAST FOUR WEEKS HOW OFTEN DID YOUR ASTHMA SYMPTOMS (WHEEZING, COUGHING, SHORTNESS OF BREATH, CHEST TIGHTNESS OR PAIN) WAKE YOU UP AT NIGHT OR EARLIER THAN USUAL IN THE MORNING: NOT AT ALL
ACT_TOTALSCORE: 25
ACT_TOTALSCORE: 25
QUESTION_4 LAST FOUR WEEKS HOW OFTEN HAVE YOU USED YOUR RESCUE INHALER OR NEBULIZER MEDICATION (SUCH AS ALBUTEROL): NOT AT ALL
QUESTION_2 LAST FOUR WEEKS HOW OFTEN HAVE YOU HAD SHORTNESS OF BREATH: NOT AT ALL
QUESTION_1 LAST FOUR WEEKS HOW MUCH OF THE TIME DID YOUR ASTHMA KEEP YOU FROM GETTING AS MUCH DONE AT WORK, SCHOOL OR AT HOME: NONE OF THE TIME

## 2024-01-29 NOTE — PROGRESS NOTES
Assessment & Plan     Screening for HIV (human immunodeficiency virus)    - HIV Antigen Antibody Combo; Future    Need for hepatitis C screening test    - Hepatitis C Screen Reflex to HCV RNA Quant and Genotype; Future    Irritable bowel syndrome without diarrhea  Discussed the diagnosis of irritable bowel syndrome.  And trajectory.  It is a diagnosis of exclusion.  In general, gluten enteropathy, malabsorption chronic colitis, chronic infectious diseases need to be ruled out and are usually associated with weight loss and nutritional deficiencies..   diarrhea and bloating without weight loss is more typical of IBS. In order for us to do the testing she would need to be on her regular diet with gluten.  Otherwise the gold standard is endoscopic small bowel biopsy.  Will start off with blood work to the TG antibody after eating a gluten rich diet.  Check for nutritional deficiencies.  If any abnormalities detected would recommend endoscopy colonoscopy.  - Tissue transglutaminase darlyn IgA and IgG; Future  - CBC with platelets and differential; Future  - Comprehensive metabolic panel; Future  - Fecal Lactoferrin; Future  - TSH; Future  - Prealbumin; Future  - Iron and iron binding capacity; Future  - Ferritin; Future  - Methylmalonic Acid; Future  - Lipid panel reflex to direct LDL Fasting; Future    She has been on escitalopram preceding this.            Mohini Sanchez is a 33 year old, presenting for the following health issues:    office visit  and Recheck Medication (Pt Is here for office visit )      1/29/2024    12:02 PM   Additional Questions   Roomed by zhanna         1/29/2024    12:02 PM   Patient Reported Additional Medications   Patient reports taking the following new medications no     History of Present Illness       Reason for visit:  Im working with a dietician and she suggested getting tested for Celiacs and a number of other nutrient panels    She eats 4 or more servings of fruits and  "vegetables daily.She consumes 0 sweetened beverage(s) daily.She exercises with enough effort to increase her heart rate 60 or more minutes per day.  She exercises with enough effort to increase her heart rate 6 days per week.   She is taking medications regularly.           Current Outpatient Medications   Medication    escitalopram (LEXAPRO) 5 MG tablet     No current facility-administered medications for this visit.               Objective    /73 (BP Location: Left arm, Patient Position: Sitting, Cuff Size: Adult Regular)   Pulse 65   Resp 16   Ht 1.727 m (5' 8\")   Wt 62.4 kg (137 lb 9.6 oz)   LMP 01/12/2024 (Approximate)   SpO2 98%   BMI 20.92 kg/m    Body mass index is 20.92 kg/m .  Physical Exam   GENERAL: alert and no distress  NECK: no adenopathy, no asymmetry, masses, or scars  RESP: lungs clear to auscultation - no rales, rhonchi or wheezes  CV: regular rate and rhythm, normal S1 S2, no S3 or S4, no murmur, click or rub, no peripheral edema  ABDOMEN: soft, nontender, no hepatosplenomegaly, no masses and bowel sounds normal  MS: no gross musculoskeletal defects noted, no edema            Signed Electronically by: Helen Wright MD    "

## 2024-02-19 ENCOUNTER — TELEPHONE (OUTPATIENT)
Dept: INTERNAL MEDICINE | Facility: CLINIC | Age: 34
End: 2024-02-19
Payer: COMMERCIAL

## 2024-02-19 DIAGNOSIS — K58.2 IRRITABLE BOWEL SYNDROME WITH BOTH CONSTIPATION AND DIARRHEA: Primary | ICD-10-CM

## 2024-02-19 NOTE — TELEPHONE ENCOUNTER
Order/Referral Request    Who is requesting: Patient    Orders being requested: endoscopy     Reason service is needed/diagnosis: Pt requesting referral for an endoscopy    When are orders needed by: asap    Has this been discussed with Provider: Yes    Does patient have a preference on a Group/Provider/Facility? anywhere    Does patient have an appointment scheduled?: No    Where to send orders: Place orders within Epic    Okay to leave a detailed message?: Yes at Cell number on file:    Telephone Information:   Mobile [phone]

## 2024-02-20 NOTE — TELEPHONE ENCOUNTER
Patient returned phone call and claims she is okay with starting a gluten-rich diet prior to having labs done but she would like to minimize timeframe between having the labs and having a confirmatory endoscopy done, if it is required. Therefore, she is requesting that the referral be ready in her chart in order for her to schedule an appointment for the endoscopy portion. If unable to send referral prior to getting labs done, patient would like to be updated with this information. Patient plans to schedule lab appointment.

## 2024-02-20 NOTE — TELEPHONE ENCOUNTER
Attempt 1/3 to reach patient and gather more information.  Per visit notes below, patient was to eat Gluten rich diet and return for blood work prior to placing endoscopy referral.  Labs were ordered but have not been processed.      1/29/24 Office Visit    Irritable bowel syndrome without diarrhea  Discussed the diagnosis of irritable bowel syndrome.  And trajectory.  It is a diagnosis of exclusion.  In general, gluten enteropathy, malabsorption chronic colitis, chronic infectious diseases need to be ruled out and are usually associated with weight loss and nutritional deficiencies..   diarrhea and bloating without weight loss is more typical of IBS. In order for us to do the testing she would need to be on her regular diet with gluten.  Otherwise the gold standard is endoscopic small bowel biopsy.  Will start off with blood work to the TG antibody after eating a gluten rich diet.  Check for nutritional deficiencies.  If any abnormalities detected would recommend endoscopy colonoscopy.  - Tissue transglutaminase darlyn IgA and IgG; Future  - CBC with platelets and differential; Future  - Comprehensive metabolic panel; Future  - Fecal Lactoferrin; Future  - TSH; Future  - Prealbumin; Future  - Iron and iron binding capacity; Future  - Ferritin; Future  - Methylmalonic Acid; Future  - Lipid panel reflex to direct LDL Fasting; Future

## 2024-02-22 NOTE — TELEPHONE ENCOUNTER
Spoke with patient and relayed information below from Dr Wright. Patient verbalized understanding and has no further questions.

## 2024-02-27 ENCOUNTER — HOSPITAL ENCOUNTER (OUTPATIENT)
Facility: CLINIC | Age: 34
End: 2024-02-27
Attending: INTERNAL MEDICINE | Admitting: INTERNAL MEDICINE
Payer: COMMERCIAL

## 2024-02-27 ENCOUNTER — TELEPHONE (OUTPATIENT)
Dept: GASTROENTEROLOGY | Facility: CLINIC | Age: 34
End: 2024-02-27
Payer: COMMERCIAL

## 2024-02-27 NOTE — TELEPHONE ENCOUNTER
"Endoscopy Scheduling Screen    Have you had a positive Covid test in the last 14 days?  No    Are you active on MyChart?   Yes    What insurance is in the chart?  Other:  cign[employer] /hp    Ordering/Referring Provider: PIEDAD FERREIRA    (If ordering provider performs procedure, schedule with ordering provider unless otherwise instructed. )    BMI: Estimated body mass index is 20.92 kg/m  as calculated from the following:    Height as of 1/29/24: 1.727 m (5' 8\").    Weight as of 1/29/24: 62.4 kg (137 lb 9.6 oz).     Sedation Ordered  moderate sedation.   If patient BMI > 50 do not schedule in [facility].    If patient BMI > 45 do not schedule at [facility].    Are you taking methadone or Suboxone?  No    Have you had difficulties, pain, or discomfort during past endoscopy procedures?  No    Are you taking any prescription medications for pain 3 or more times per week?   NO - No RN review required.    Do you have a history of malignant hyperthermia or adverse reaction to anesthesia?  No    (Females) Are you currently pregnant?   No     Have you been diagnosed or told you have pulmonary hypertension?   No    Do you have an LVAD?  No    Have you been told you have moderate to severe sleep apnea?  No    Have you been told you have COPD, asthma, or any other lung disease?  Yes     What breathing problems do you have?  Asthma     Do you use home oxygen?  No    Have your breathing problems required an ED visit or hospitalization in the last year?  No    Do you have any heart conditions?  No     Have you ever had an organ transplant?   No    Have you ever had or are you awaiting a heart or lung transplant?   No    Have you had a stroke or transient ischemic attack (TIA aka \"mini stroke\" in the last 6 months?   No    Have you been diagnosed with or been told you have cirrhosis of the liver?   No    Are you currently on dialysis?   No  Do you need assistance transferring?   No    BMI: Estimated body mass index is 20.92 kg/m  as calculated from " "the following:    Height as of 1/29/24: 1.727 m (5' 8\").    Weight as of 1/29/24: 62.4 kg (137 lb 9.6 oz).     Is patients BMI > 40 and scheduling location [facility]?  No    Do you take an injectable medication for weight loss or diabetes (excluding insulin)?  No    Do you take the medication Naltrexone?  No    Do you take blood thinners?  No       Prep   Are you currently on dialysis or do you have chronic kidney disease?  No    Do you have a diagnosis of diabetes?  No    Do you have a diagnosis of cystic fibrosis (CF)?  No    On a regular basis do you go 3 -5 days between bowel movements?  No    BMI > 40?  No    Preferred Pharmacy:        [id number] [address] [address]  Phone: [phone] Fax: [phone]    W  Final Scheduling Details   Colonoscopy prep sent?  N/A    Procedure scheduled  Colonoscopy / Upper endoscopy (EGD)    Surgeon:  Haley     Date of procedure:  6/14/24     Pre-OP / PAC:   No - Not required for this site.    Location  [facility]    Sedation   Moderate Sedation - Per order.      Patient Reminders:   You will receive a call from a Nurse to review instructions and health history.  This assessment must be completed prior to your procedure.  Failure to complete the Nurse assessment may result in the procedure being cancelled.      On the day of your procedure, please designate an adult(s) who can drive you home stay with you for the next 24 hours. The medicines used in the exam will make you sleepy. You will not be able to drive.      You cannot take public transportation, ride share services, or non-medical taxi service without a responsible caregiver.  Medical transport services are allowed with the requirement that a responsible caregiver will receive you at your destination.  We require that drivers and caregivers are confirmed prior to your procedure.   "

## 2024-02-29 ENCOUNTER — LAB (OUTPATIENT)
Dept: LAB | Facility: CLINIC | Age: 34
End: 2024-02-29
Payer: COMMERCIAL

## 2024-02-29 DIAGNOSIS — Z11.59 NEED FOR HEPATITIS C SCREENING TEST: ICD-10-CM

## 2024-02-29 DIAGNOSIS — K58.9 IRRITABLE BOWEL SYNDROME WITHOUT DIARRHEA: ICD-10-CM

## 2024-02-29 DIAGNOSIS — Z11.4 SCREENING FOR HIV (HUMAN IMMUNODEFICIENCY VIRUS): ICD-10-CM

## 2024-02-29 LAB
ALBUMIN SERPL BCG-MCNC: 4.7 G/DL (ref 3.5–5.2)
ALP SERPL-CCNC: 51 U/L (ref 40–150)
ALT SERPL W P-5'-P-CCNC: 21 U/L (ref 0–50)
ANION GAP SERPL CALCULATED.3IONS-SCNC: 12 MMOL/L (ref 7–15)
AST SERPL W P-5'-P-CCNC: 22 U/L (ref 0–45)
BASOPHILS # BLD AUTO: 0.1 10E3/UL (ref 0–0.2)
BASOPHILS NFR BLD AUTO: 1 %
BILIRUB SERPL-MCNC: 0.3 MG/DL
BUN SERPL-MCNC: 12.3 MG/DL (ref 6–20)
CALCIUM SERPL-MCNC: 9.4 MG/DL (ref 8.6–10)
CHLORIDE SERPL-SCNC: 105 MMOL/L (ref 98–107)
CHOLEST SERPL-MCNC: 202 MG/DL
CREAT SERPL-MCNC: 0.99 MG/DL (ref 0.51–0.95)
DEPRECATED HCO3 PLAS-SCNC: 24 MMOL/L (ref 22–29)
EGFRCR SERPLBLD CKD-EPI 2021: 76 ML/MIN/1.73M2
EOSINOPHIL # BLD AUTO: 0.1 10E3/UL (ref 0–0.7)
EOSINOPHIL NFR BLD AUTO: 2 %
ERYTHROCYTE [DISTWIDTH] IN BLOOD BY AUTOMATED COUNT: 12.9 % (ref 10–15)
FASTING STATUS PATIENT QL REPORTED: NO
FERRITIN SERPL-MCNC: 100 NG/ML (ref 6–175)
GLUCOSE SERPL-MCNC: 82 MG/DL (ref 70–99)
HCT VFR BLD AUTO: 41.8 % (ref 35–47)
HDLC SERPL-MCNC: 94 MG/DL
HGB BLD-MCNC: 13.5 G/DL (ref 11.7–15.7)
HIV 1+2 AB+HIV1 P24 AG SERPL QL IA: NONREACTIVE
IMM GRANULOCYTES # BLD: 0 10E3/UL
IMM GRANULOCYTES NFR BLD: 0 %
IRON BINDING CAPACITY (ROCHE): 260 UG/DL (ref 240–430)
IRON SATN MFR SERPL: 25 % (ref 15–46)
IRON SERPL-MCNC: 64 UG/DL (ref 37–145)
LDLC SERPL CALC-MCNC: 95 MG/DL
LYMPHOCYTES # BLD AUTO: 2.2 10E3/UL (ref 0.8–5.3)
LYMPHOCYTES NFR BLD AUTO: 35 %
MCH RBC QN AUTO: 31.3 PG (ref 26.5–33)
MCHC RBC AUTO-ENTMCNC: 32.3 G/DL (ref 31.5–36.5)
MCV RBC AUTO: 97 FL (ref 78–100)
MONOCYTES # BLD AUTO: 0.3 10E3/UL (ref 0–1.3)
MONOCYTES NFR BLD AUTO: 4 %
NEUTROPHILS # BLD AUTO: 3.6 10E3/UL (ref 1.6–8.3)
NEUTROPHILS NFR BLD AUTO: 58 %
NONHDLC SERPL-MCNC: 108 MG/DL
PLATELET # BLD AUTO: 336 10E3/UL (ref 150–450)
POTASSIUM SERPL-SCNC: 4.4 MMOL/L (ref 3.4–5.3)
PREALB SERPL-MCNC: 23.8 MG/DL (ref 20–40)
PROT SERPL-MCNC: 7.1 G/DL (ref 6.4–8.3)
RBC # BLD AUTO: 4.32 10E6/UL (ref 3.8–5.2)
SODIUM SERPL-SCNC: 141 MMOL/L (ref 135–145)
TRIGL SERPL-MCNC: 66 MG/DL
TSH SERPL DL<=0.005 MIU/L-ACNC: 2.82 UIU/ML (ref 0.3–4.2)
WBC # BLD AUTO: 6.2 10E3/UL (ref 4–11)

## 2024-02-29 PROCEDURE — 36415 COLL VENOUS BLD VENIPUNCTURE: CPT

## 2024-02-29 PROCEDURE — 83921 ORGANIC ACID SINGLE QUANT: CPT

## 2024-02-29 PROCEDURE — 86364 TISS TRNSGLTMNASE EA IG CLAS: CPT

## 2024-02-29 PROCEDURE — 87389 HIV-1 AG W/HIV-1&-2 AB AG IA: CPT

## 2024-02-29 PROCEDURE — 80061 LIPID PANEL: CPT

## 2024-02-29 PROCEDURE — 83540 ASSAY OF IRON: CPT

## 2024-02-29 PROCEDURE — 83550 IRON BINDING TEST: CPT

## 2024-02-29 PROCEDURE — 84134 ASSAY OF PREALBUMIN: CPT

## 2024-02-29 PROCEDURE — 85025 COMPLETE CBC W/AUTO DIFF WBC: CPT

## 2024-02-29 PROCEDURE — 82728 ASSAY OF FERRITIN: CPT

## 2024-02-29 PROCEDURE — 80053 COMPREHEN METABOLIC PANEL: CPT

## 2024-02-29 PROCEDURE — 86803 HEPATITIS C AB TEST: CPT

## 2024-02-29 PROCEDURE — 84443 ASSAY THYROID STIM HORMONE: CPT

## 2024-03-01 LAB
HCV AB SERPL QL IA: NONREACTIVE
LACTOFERRIN STL QL IA: NEGATIVE
TTG IGA SER-ACNC: 0.7 U/ML
TTG IGG SER-ACNC: 0.8 U/ML

## 2024-03-01 PROCEDURE — 83630 LACTOFERRIN FECAL (QUAL): CPT

## 2024-03-05 LAB — METHYLMALONATE SERPL-SCNC: 0.12 UMOL/L (ref 0–0.4)

## 2024-05-22 ENCOUNTER — VIRTUAL VISIT (OUTPATIENT)
Dept: INTERNAL MEDICINE | Facility: CLINIC | Age: 34
End: 2024-05-22
Payer: COMMERCIAL

## 2024-05-22 ENCOUNTER — TELEPHONE (OUTPATIENT)
Dept: GASTROENTEROLOGY | Facility: CLINIC | Age: 34
End: 2024-05-22
Payer: COMMERCIAL

## 2024-05-22 DIAGNOSIS — R19.4 CHANGE IN BOWEL HABITS: Primary | ICD-10-CM

## 2024-05-22 DIAGNOSIS — R11.0 NAUSEA: ICD-10-CM

## 2024-05-22 DIAGNOSIS — K50.119 CROHN'S DISEASE OF COLON WITH COMPLICATION (H): ICD-10-CM

## 2024-05-22 DIAGNOSIS — K21.00 GASTROESOPHAGEAL REFLUX DISEASE WITH ESOPHAGITIS WITHOUT HEMORRHAGE: ICD-10-CM

## 2024-05-22 PROCEDURE — 99213 OFFICE O/P EST LOW 20 MIN: CPT | Mod: 95 | Performed by: INTERNAL MEDICINE

## 2024-05-22 NOTE — TELEPHONE ENCOUNTER
Caller:     Reason for Reschedule/Cancellation   (please be detailed, any staff messages or encounters to note?): INSURANCE      Prior to reschedule please review:  Ordering Provider:     PIEDAD FERREIRA     Sedation Determined: CS  Does patient have any ASC Exclusions, please identify?:       Notes on Cancelled Procedure:  Procedure: Upper and Lower Endoscopy [EGD and Colonoscopy]   Date: 6/14  Location: [facility]; [address] [place] [address], [address] Floor, [place] [phone]   Surgeon: RANDA      Rescheduled: No, =       Did you cancel or rescheduled an [facility] procedure? No.

## 2024-05-22 NOTE — PROGRESS NOTES
Laura is a 33 year old who is being evaluated via a billable video visit.    How would you like to obtain your AVS? MyChart  If the video visit is dropped, the invitation should be resent by: Send to e-mail at: [email or web address]  Will anyone else be joining your video visit? No      Assessment & Plan     Change in bowel habits  Continues to have change in bowel habits alternating diarrhea with loose watery stools and constipation.  Preliminary testing has not shown any abnormality however colitis microscopic colitis and inflammatory colitis can present with change in bowel habits even with negative blood tests.  I do recommend a colonoscopy with random colonic biopsies.  If negative then a diagnosis of irritable bowel syndrome can be made.    Gastroesophageal reflux disease with esophagitis without hemorrhage  Given her ongoing symptoms with heartburn and nausea with some mild improvement going on a gluten sensitive diet it is highly suspicious for gluten sensitivity.  Even if the gluten antibody test is negative there is a possibility small bowel biopsy is the gold standard.  In addition the nausea is unexplained.  Peptic ulcer disease dyspepsia should be considered significant reflux or hiatal hernia.  Do recommend endoscopy with a diagnostic colonoscopy.    Nausea  With loss of appetite.    Crohn's disease of colon with complication (H)  She has a family history of both ulcerative colitis and Crohn's disease which she did not know off till now.  They are both grandparents had inflammatory bowel disease.                Subjective   Laura is a 33 year old, presenting for the following health issues:    Follow Up (Revisiting GI issues related to family history with ulcerative colitis and potentially crohne's. Would like colonoscopy and endoscopy order - previously denied by insurance. )        5/22/2024    11:10 AM   Additional Questions   Roomed by Priti     History of Present Illness       Reason  for visit:  Revisiting GI issues related to family history with ulcerative colitis and potentially crohne's.    She eats 4 or more servings of fruits and vegetables daily.She consumes 0 sweetened beverage(s) daily.She exercises with enough effort to increase her heart rate 60 or more minutes per day.  She exercises with enough effort to increase her heart rate 6 days per week.   She is taking medications regularly.                   Objective    Vitals - Patient Reported  Pulse (Patient Reported): 61  Pain Score: No Pain (0)        Physical Exam             Video-Visit Details  Duration 1min   Type of service:  Video Visit   Originating Location (pt. Location): Home    Distant Location (provider location):  On-site  Platform used for Video Visit: Lio  Signed Electronically by: Helen Wright MD

## 2024-05-23 PROBLEM — R19.4 CHANGE IN BOWEL HABITS: Status: ACTIVE | Noted: 2024-05-23

## 2024-05-23 PROBLEM — K50.119 CROHN'S DISEASE OF COLON WITH COMPLICATION (H): Status: ACTIVE | Noted: 2024-05-23

## 2024-06-23 ENCOUNTER — HEALTH MAINTENANCE LETTER (OUTPATIENT)
Age: 34
End: 2024-06-23

## 2024-11-01 ENCOUNTER — TELEPHONE (OUTPATIENT)
Dept: INTERNAL MEDICINE | Facility: CLINIC | Age: 34
End: 2024-11-01
Payer: COMMERCIAL

## 2024-11-01 NOTE — TELEPHONE ENCOUNTER
General Call      Reason for Call:  prior auth    What are your questions or concerns:  Pt states that endoscopy was denied by insurance due to needing a prior auth and insurance wants to put pt on  PPI plan for 60 days but pt states that she feels it would be irresponsible to be put on a medication that for no reason.   Pt is supposed to have procedure on Tuesday Nov 5th, 2024    Date of last appointment with provider: 05/22/2024    Could we send this information to you in [facility]  or would you prefer to receive a phone call?:   Patient would prefer a phone call   Okay to leave a detailed message?: Yes at Cell number on file:    Telephone Information:   Mobile [phone]

## 2024-11-01 NOTE — TELEPHONE ENCOUNTER
I would advise postponing the endoscopy . Peer to peer  also not be able to be done in time for Tuesday , they make appointments for us to speak to their doctor and that could be anytime next week . They also could deny it    It is easier to go by their guidelines   Unfortunately Only high risk symptoms like a cancer suspicion or weight loss can be sed to justify bypassing their requirements

## 2024-11-01 NOTE — TELEPHONE ENCOUNTER
Pt called back to give direct phone number for [facility] to Peer phone call with insurance company - [phone]

## 2024-11-04 NOTE — TELEPHONE ENCOUNTER
Relayed provider advisement to patient.  Patient would like to move ahead with peer to peer.  Writer advised patient, it is very possible it will be denied. Patient verbalized understanding.

## 2024-11-05 ENCOUNTER — TRANSFERRED RECORDS (OUTPATIENT)
Dept: HEALTH INFORMATION MANAGEMENT | Facility: CLINIC | Age: 34
End: 2024-11-05
Payer: COMMERCIAL

## 2025-07-12 ENCOUNTER — HEALTH MAINTENANCE LETTER (OUTPATIENT)
Age: 35
End: 2025-07-12

## (undated) DEVICE — ESU DISSECTOR SONICISION CORDLESS 39CM SCD396

## (undated) DEVICE — STPL ENDO HANDLE GIA ULTRA UNIVERSAL STD EGIAUSTND

## (undated) DEVICE — CATH TRAY FOLEY SURESTEP 16FR W/URNE MTR STLK LATEX A303316A

## (undated) DEVICE — ANTIFOG SOLUTION W/FOAM PAD 31142527

## (undated) DEVICE — ESU GROUND PAD ADULT W/CORD E7507

## (undated) DEVICE — ESU PENCIL W/COATED BLADE E2450H

## (undated) DEVICE — LINEN TOWEL PACK X6 WHITE 5487

## (undated) DEVICE — SU VICRYL 4-0 PS-2 18" UND J496H

## (undated) DEVICE — Device

## (undated) DEVICE — DRSG PRIMAPORE 02X3" 7133

## (undated) DEVICE — SU VICRYL 0 UR-6 27" J603H

## (undated) DEVICE — ENDO TROCAR BLUNT TIP KII BALLOON 12X130MM C0R50

## (undated) DEVICE — NDL INSUFFLATION 120MM VERRES 172015

## (undated) DEVICE — LINEN TOWEL PACK X5 5464

## (undated) DEVICE — COVER CAMERA IN-LIGHT DISP LT-C02

## (undated) DEVICE — ENDO TROCAR OPTICAL 05MM VERSAPORT PLUS W/FIX CAN ONB5STF

## (undated) DEVICE — PREP CHLORAPREP 26ML TINTED ORANGE  260815

## (undated) DEVICE — STPL ENDO RELOAD GIA 45X3.5MM ROTIC 030455

## (undated) DEVICE — LIGHT HANDLE X1 31140133

## (undated) DEVICE — DRSG STERI STRIP 1/2X4" R1547

## (undated) DEVICE — ENDO POUCH GOLD 10MM ECATCH 173050G

## (undated) RX ORDER — FENTANYL CITRATE 50 UG/ML
INJECTION, SOLUTION INTRAMUSCULAR; INTRAVENOUS
Status: DISPENSED
Start: 2017-03-27

## (undated) RX ORDER — HYDROMORPHONE HYDROCHLORIDE 1 MG/ML
INJECTION, SOLUTION INTRAMUSCULAR; INTRAVENOUS; SUBCUTANEOUS
Status: DISPENSED
Start: 2017-03-27